# Patient Record
Sex: MALE | Race: WHITE | NOT HISPANIC OR LATINO | URBAN - METROPOLITAN AREA
[De-identification: names, ages, dates, MRNs, and addresses within clinical notes are randomized per-mention and may not be internally consistent; named-entity substitution may affect disease eponyms.]

---

## 2020-01-29 ENCOUNTER — APPOINTMENT (RX ONLY)
Dept: URBAN - METROPOLITAN AREA CLINIC 174 | Facility: CLINIC | Age: 55
Setting detail: DERMATOLOGY
End: 2020-01-29

## 2020-01-29 DIAGNOSIS — L82.1 OTHER SEBORRHEIC KERATOSIS: ICD-10-CM

## 2020-01-29 DIAGNOSIS — L30.0 NUMMULAR DERMATITIS: ICD-10-CM

## 2020-01-29 PROCEDURE — 99202 OFFICE O/P NEW SF 15 MIN: CPT

## 2020-01-29 PROCEDURE — ? TREATMENT REGIMEN

## 2020-01-29 PROCEDURE — ? COUNSELING

## 2020-01-29 PROCEDURE — ? PRESCRIPTION

## 2020-01-29 RX ORDER — TRIAMCINOLONE ACETONIDE 1 MG/G
1 APPLICATION OINTMENT TOPICAL BID
Qty: 2 | Refills: 0 | Status: ERX | COMMUNITY
Start: 2020-01-29

## 2020-01-29 RX ADMIN — TRIAMCINOLONE ACETONIDE 1 APPLICATION: 1 OINTMENT TOPICAL at 00:00

## 2020-01-29 ASSESSMENT — LOCATION SIMPLE DESCRIPTION DERM
LOCATION SIMPLE: CHEST
LOCATION SIMPLE: RIGHT CALF
LOCATION SIMPLE: RIGHT PRETIBIAL REGION
LOCATION SIMPLE: LEFT CALF
LOCATION SIMPLE: LEFT PRETIBIAL REGION

## 2020-01-29 ASSESSMENT — LOCATION ZONE DERM
LOCATION ZONE: TRUNK
LOCATION ZONE: LEG

## 2020-01-29 ASSESSMENT — LOCATION DETAILED DESCRIPTION DERM
LOCATION DETAILED: LEFT LATERAL SUPERIOR CHEST
LOCATION DETAILED: RIGHT DISTAL PRETIBIAL REGION
LOCATION DETAILED: LEFT DISTAL CALF
LOCATION DETAILED: RIGHT DISTAL CALF
LOCATION DETAILED: LEFT DISTAL PRETIBIAL REGION

## 2020-01-29 ASSESSMENT — SEVERITY ASSESSMENT: SEVERITY: MODERATE

## 2020-01-29 NOTE — PROCEDURE: MIPS QUALITY
Quality 130: Documentation Of Current Medications In The Medical Record: Current Medications Documented
Detail Level: Detailed
Quality 110: Preventive Care And Screening: Influenza Immunization: Influenza Immunization Administered during Influenza season
Quality 431: Preventive Care And Screening: Unhealthy Alcohol Use - Screening: Patient screened for unhealthy alcohol use using a single question and scores less than 2 times per year
Quality 226: Preventive Care And Screening: Tobacco Use: Screening And Cessation Intervention: Patient screened for tobacco use, is a smoker AND did not received Cessation Counseling for Unknown Reasons

## 2020-01-29 NOTE — PROCEDURE: TREATMENT REGIMEN
Samples Given: Eucerin Advanced Care: apply immediately after shower to legs; Aquaphor: apply PRN
Detail Level: Zone
Plan: Keep showers on the cooler side.  I stressed the importance of moisturization and taking the Claritin BID even if itch is controlled.  I will see him back in 1 month to reassess.

## 2021-02-11 ENCOUNTER — OFFICE VISIT (OUTPATIENT)
Dept: INTERNAL MEDICINE CLINIC | Age: 56
End: 2021-02-11
Payer: COMMERCIAL

## 2021-02-11 VITALS
HEART RATE: 87 BPM | SYSTOLIC BLOOD PRESSURE: 148 MMHG | TEMPERATURE: 97.4 F | WEIGHT: 250.2 LBS | BODY MASS INDEX: 32.11 KG/M2 | OXYGEN SATURATION: 98 % | HEIGHT: 74 IN | DIASTOLIC BLOOD PRESSURE: 92 MMHG

## 2021-02-11 DIAGNOSIS — E11.65 TYPE 2 DIABETES MELLITUS WITH HYPERGLYCEMIA, WITHOUT LONG-TERM CURRENT USE OF INSULIN (HCC): Primary | ICD-10-CM

## 2021-02-11 DIAGNOSIS — E78.2 MIXED HYPERLIPIDEMIA: ICD-10-CM

## 2021-02-11 DIAGNOSIS — M54.9 DORSALGIA: ICD-10-CM

## 2021-02-11 DIAGNOSIS — F32.A CHRONIC DEPRESSION: ICD-10-CM

## 2021-02-11 DIAGNOSIS — Z23 NEED FOR TDAP VACCINATION: ICD-10-CM

## 2021-02-11 DIAGNOSIS — J30.9 ALLERGIC RHINITIS, UNSPECIFIED SEASONALITY, UNSPECIFIED TRIGGER: ICD-10-CM

## 2021-02-11 DIAGNOSIS — I10 ESSENTIAL HYPERTENSION: ICD-10-CM

## 2021-02-11 DIAGNOSIS — J44.9 CHRONIC OBSTRUCTIVE PULMONARY DISEASE, UNSPECIFIED COPD TYPE (HCC): ICD-10-CM

## 2021-02-11 DIAGNOSIS — G62.9 NEUROPATHY: ICD-10-CM

## 2021-02-11 DIAGNOSIS — R53.83 FATIGUE, UNSPECIFIED TYPE: ICD-10-CM

## 2021-02-11 LAB
A/G RATIO: 1.9 (ref 1.1–2.2)
ALBUMIN SERPL-MCNC: 4.5 G/DL (ref 3.4–5)
ALP BLD-CCNC: 92 U/L (ref 40–129)
ALT SERPL-CCNC: 36 U/L (ref 10–40)
ANION GAP SERPL CALCULATED.3IONS-SCNC: 13 MMOL/L (ref 3–16)
AST SERPL-CCNC: 27 U/L (ref 15–37)
BASOPHILS ABSOLUTE: 0.1 K/UL (ref 0–0.2)
BASOPHILS RELATIVE PERCENT: 0.9 %
BILIRUB SERPL-MCNC: <0.2 MG/DL (ref 0–1)
BUN BLDV-MCNC: 8 MG/DL (ref 7–20)
CALCIUM SERPL-MCNC: 9.7 MG/DL (ref 8.3–10.6)
CHLORIDE BLD-SCNC: 104 MMOL/L (ref 99–110)
CHOLESTEROL, FASTING: 137 MG/DL (ref 0–199)
CHP ED QC CHECK: NORMAL
CO2: 24 MMOL/L (ref 21–32)
CREAT SERPL-MCNC: 0.9 MG/DL (ref 0.9–1.3)
EOSINOPHILS ABSOLUTE: 0.6 K/UL (ref 0–0.6)
EOSINOPHILS RELATIVE PERCENT: 5.8 %
GFR AFRICAN AMERICAN: >60
GFR NON-AFRICAN AMERICAN: >60
GLOBULIN: 2.4 G/DL
GLUCOSE BLD-MCNC: 142 MG/DL (ref 70–99)
GLUCOSE BLD-MCNC: 144 MG/DL
HCT VFR BLD CALC: 43.2 % (ref 40.5–52.5)
HDLC SERPL-MCNC: 36 MG/DL (ref 40–60)
HEMOGLOBIN: 14.7 G/DL (ref 13.5–17.5)
LDL CHOLESTEROL CALCULATED: ABNORMAL MG/DL
LDL CHOLESTEROL DIRECT: 73 MG/DL
LYMPHOCYTES ABSOLUTE: 2.9 K/UL (ref 1–5.1)
LYMPHOCYTES RELATIVE PERCENT: 30.2 %
MCH RBC QN AUTO: 30.4 PG (ref 26–34)
MCHC RBC AUTO-ENTMCNC: 33.9 G/DL (ref 31–36)
MCV RBC AUTO: 89.6 FL (ref 80–100)
MONOCYTES ABSOLUTE: 0.6 K/UL (ref 0–1.3)
MONOCYTES RELATIVE PERCENT: 6.6 %
NEUTROPHILS ABSOLUTE: 5.4 K/UL (ref 1.7–7.7)
NEUTROPHILS RELATIVE PERCENT: 56.5 %
PDW BLD-RTO: 14.3 % (ref 12.4–15.4)
PLATELET # BLD: 189 K/UL (ref 135–450)
PMV BLD AUTO: 9.9 FL (ref 5–10.5)
POTASSIUM SERPL-SCNC: 4.2 MMOL/L (ref 3.5–5.1)
RBC # BLD: 4.83 M/UL (ref 4.2–5.9)
SODIUM BLD-SCNC: 141 MMOL/L (ref 136–145)
TOTAL PROTEIN: 6.9 G/DL (ref 6.4–8.2)
TRIGLYCERIDE, FASTING: 306 MG/DL (ref 0–150)
TSH SERPL DL<=0.05 MIU/L-ACNC: 1.14 UIU/ML (ref 0.27–4.2)
VLDLC SERPL CALC-MCNC: ABNORMAL MG/DL
WBC # BLD: 9.6 K/UL (ref 4–11)

## 2021-02-11 PROCEDURE — G8926 SPIRO NO PERF OR DOC: HCPCS | Performed by: INTERNAL MEDICINE

## 2021-02-11 PROCEDURE — G8484 FLU IMMUNIZE NO ADMIN: HCPCS | Performed by: INTERNAL MEDICINE

## 2021-02-11 PROCEDURE — G8417 CALC BMI ABV UP PARAM F/U: HCPCS | Performed by: INTERNAL MEDICINE

## 2021-02-11 PROCEDURE — 3023F SPIROM DOC REV: CPT | Performed by: INTERNAL MEDICINE

## 2021-02-11 PROCEDURE — 82962 GLUCOSE BLOOD TEST: CPT | Performed by: INTERNAL MEDICINE

## 2021-02-11 PROCEDURE — 36415 COLL VENOUS BLD VENIPUNCTURE: CPT | Performed by: INTERNAL MEDICINE

## 2021-02-11 PROCEDURE — 3046F HEMOGLOBIN A1C LEVEL >9.0%: CPT | Performed by: INTERNAL MEDICINE

## 2021-02-11 PROCEDURE — 3017F COLORECTAL CA SCREEN DOC REV: CPT | Performed by: INTERNAL MEDICINE

## 2021-02-11 PROCEDURE — G8427 DOCREV CUR MEDS BY ELIG CLIN: HCPCS | Performed by: INTERNAL MEDICINE

## 2021-02-11 PROCEDURE — 2022F DILAT RTA XM EVC RTNOPTHY: CPT | Performed by: INTERNAL MEDICINE

## 2021-02-11 PROCEDURE — 99204 OFFICE O/P NEW MOD 45 MIN: CPT | Performed by: INTERNAL MEDICINE

## 2021-02-11 PROCEDURE — 4004F PT TOBACCO SCREEN RCVD TLK: CPT | Performed by: INTERNAL MEDICINE

## 2021-02-11 RX ORDER — FLUTICASONE PROPIONATE 50 MCG
1 SPRAY, SUSPENSION (ML) NASAL DAILY
COMMUNITY
End: 2021-02-11 | Stop reason: SDUPTHER

## 2021-02-11 RX ORDER — M-VIT,TX,IRON,MINS/CALC/FOLIC 27MG-0.4MG
1 TABLET ORAL DAILY
Qty: 30 TABLET | Refills: 11 | Status: SHIPPED | OUTPATIENT
Start: 2021-02-11 | End: 2021-05-14 | Stop reason: SDUPTHER

## 2021-02-11 RX ORDER — METFORMIN HYDROCHLORIDE 500 MG/1
500 TABLET, EXTENDED RELEASE ORAL
COMMUNITY
End: 2021-02-11 | Stop reason: SDUPTHER

## 2021-02-11 RX ORDER — FLUTICASONE PROPIONATE 50 MCG
1 SPRAY, SUSPENSION (ML) NASAL DAILY
Qty: 1 BOTTLE | Refills: 3 | Status: SHIPPED | OUTPATIENT
Start: 2021-02-11 | End: 2021-05-14 | Stop reason: SDUPTHER

## 2021-02-11 RX ORDER — DULAGLUTIDE 0.75 MG/.5ML
0.75 INJECTION, SOLUTION SUBCUTANEOUS WEEKLY
Qty: 4 PEN | Refills: 3 | Status: SHIPPED | OUTPATIENT
Start: 2021-02-11 | End: 2021-02-12 | Stop reason: DRUGHIGH

## 2021-02-11 RX ORDER — LISINOPRIL 20 MG/1
20 TABLET ORAL DAILY
Qty: 90 TABLET | Refills: 1 | Status: SHIPPED | OUTPATIENT
Start: 2021-02-11 | End: 2021-05-14 | Stop reason: SDUPTHER

## 2021-02-11 RX ORDER — GABAPENTIN 300 MG/1
300 CAPSULE ORAL 3 TIMES DAILY
COMMUNITY
End: 2021-02-11 | Stop reason: SDUPTHER

## 2021-02-11 RX ORDER — NAPROXEN 375 MG/1
375 TABLET ORAL 2 TIMES DAILY WITH MEALS
Qty: 60 TABLET | Refills: 5 | Status: SHIPPED | OUTPATIENT
Start: 2021-02-11 | End: 2021-05-14 | Stop reason: SDUPTHER

## 2021-02-11 RX ORDER — ALBUTEROL SULFATE 90 UG/1
2 AEROSOL, METERED RESPIRATORY (INHALATION) EVERY 6 HOURS PRN
Qty: 1 INHALER | Refills: 3 | Status: SHIPPED | OUTPATIENT
Start: 2021-02-11 | End: 2021-05-14 | Stop reason: SDUPTHER

## 2021-02-11 RX ORDER — ATORVASTATIN CALCIUM 20 MG/1
20 TABLET, FILM COATED ORAL DAILY
Qty: 30 TABLET | Refills: 3 | Status: SHIPPED | OUTPATIENT
Start: 2021-02-11 | End: 2021-05-14 | Stop reason: SDUPTHER

## 2021-02-11 RX ORDER — BUPROPION HYDROCHLORIDE 150 MG/1
150 TABLET, EXTENDED RELEASE ORAL DAILY
Qty: 30 TABLET | Refills: 3 | Status: SHIPPED | OUTPATIENT
Start: 2021-02-11 | End: 2021-05-14 | Stop reason: SDUPTHER

## 2021-02-11 RX ORDER — BUPROPION HYDROCHLORIDE 150 MG/1
150 TABLET, EXTENDED RELEASE ORAL DAILY
COMMUNITY
End: 2021-02-11 | Stop reason: SDUPTHER

## 2021-02-11 RX ORDER — ATORVASTATIN CALCIUM 20 MG/1
20 TABLET, FILM COATED ORAL DAILY
COMMUNITY
End: 2021-02-11 | Stop reason: SDUPTHER

## 2021-02-11 RX ORDER — DULAGLUTIDE 0.75 MG/.5ML
0.75 INJECTION, SOLUTION SUBCUTANEOUS WEEKLY
COMMUNITY
End: 2021-02-11 | Stop reason: SDUPTHER

## 2021-02-11 RX ORDER — METFORMIN HYDROCHLORIDE 500 MG/1
500 TABLET, EXTENDED RELEASE ORAL
Qty: 30 TABLET | Refills: 3 | Status: SHIPPED | OUTPATIENT
Start: 2021-02-11 | End: 2021-05-14 | Stop reason: SDUPTHER

## 2021-02-11 RX ORDER — ALBUTEROL SULFATE 90 UG/1
2 AEROSOL, METERED RESPIRATORY (INHALATION) EVERY 6 HOURS PRN
COMMUNITY
End: 2021-02-11 | Stop reason: SDUPTHER

## 2021-02-11 RX ORDER — CITALOPRAM 20 MG/1
20 TABLET ORAL DAILY
Qty: 30 TABLET | Refills: 3 | Status: SHIPPED | OUTPATIENT
Start: 2021-02-11 | End: 2021-05-14 | Stop reason: SDUPTHER

## 2021-02-11 RX ORDER — GABAPENTIN 300 MG/1
300 CAPSULE ORAL 3 TIMES DAILY
Qty: 90 CAPSULE | Refills: 3 | Status: SHIPPED | OUTPATIENT
Start: 2021-02-11 | End: 2021-05-14 | Stop reason: SDUPTHER

## 2021-02-11 RX ORDER — CITALOPRAM 20 MG/1
20 TABLET ORAL DAILY
COMMUNITY
End: 2021-02-11 | Stop reason: SDUPTHER

## 2021-02-11 SDOH — ECONOMIC STABILITY: TRANSPORTATION INSECURITY
IN THE PAST 12 MONTHS, HAS THE LACK OF TRANSPORTATION KEPT YOU FROM MEDICAL APPOINTMENTS OR FROM GETTING MEDICATIONS?: NOT ASKED

## 2021-02-11 SDOH — ECONOMIC STABILITY: TRANSPORTATION INSECURITY
IN THE PAST 12 MONTHS, HAS LACK OF TRANSPORTATION KEPT YOU FROM MEETINGS, WORK, OR FROM GETTING THINGS NEEDED FOR DAILY LIVING?: NOT ASKED

## 2021-02-11 SDOH — ECONOMIC STABILITY: INCOME INSECURITY: HOW HARD IS IT FOR YOU TO PAY FOR THE VERY BASICS LIKE FOOD, HOUSING, MEDICAL CARE, AND HEATING?: SOMEWHAT HARD

## 2021-02-11 SDOH — ECONOMIC STABILITY: FOOD INSECURITY: WITHIN THE PAST 12 MONTHS, YOU WORRIED THAT YOUR FOOD WOULD RUN OUT BEFORE YOU GOT MONEY TO BUY MORE.: SOMETIMES TRUE

## 2021-02-11 ASSESSMENT — PATIENT HEALTH QUESTIONNAIRE - PHQ9
SUM OF ALL RESPONSES TO PHQ QUESTIONS 1-9: 0
SUM OF ALL RESPONSES TO PHQ QUESTIONS 1-9: 0
1. LITTLE INTEREST OR PLEASURE IN DOING THINGS: 0

## 2021-02-11 NOTE — LETTER
[address] [place] [address] Internal and Family Medicine  [address] [place] [id number]  Phone: [phone]  Fax: [phone]    Sarah Patiño MD        February 11, 2021     Patient: Ruy Snyder   YOB: 1965   Date of Visit: 2/11/2021       To Whom It May Concern: It is my medical opinion that Ruy Snyder can return to work on 2/12/2021. He had an appointment on 2/11/2012. If you have any questions or concerns, please don't hesitate to call.     Sincerely,        Sarah Patiño MD

## 2021-02-11 NOTE — PROGRESS NOTES
Name: Fátima Chiu  [id number]  Age: 60 y.o. YOB: 1965  Sex: male    CHIEF COMPLAINT:    Chief Complaint   Patient presents with    New Patient    Hypertension    Diabetes       HISTORY OF PRESENT ILLNESS:     This is a pleasant  60 y.o. male  is seen today to establish care and for management of chronic medical problems and medications refills. Previous records reviewed . He was seeing a primary care doctor in [facility]. Moved to [place] recently. Complains of being tired. Denies CP, complains of shortness of breath on and off. He continues to smoke about 1 pack/day and not interested in quitting smoking  No fever , sore throat or cough or congestion. Denies any abdominal pain. Appetite OK. Bowels moving [id number] No urinary symptoms. Denies any significant arthritis. Has Chronic low back pain. And some paresthesia of his legs. Hearing is ok. Vision Ok with glasses. Denies  any significant skin lesions. Denies any significant depression or anxiety. Medications help. No other complaints. Does not check sugars at home. .. has no machine. Has not had tetanus for more than 10 years.       Past Medical History:    Patient Active Problem List   Diagnosis    Type 2 diabetes mellitus with hyperglycemia, without long-term current use of insulin ([facility] [address]    Essential hypertension    Mixed hyperlipidemia    Allergic rhinitis    Chronic obstructive pulmonary disease (HCC)    Chronic depression    Need for Tdap vaccination    Neuropathy    Fatigue        Past Surgical History:        Procedure Laterality Date    KNEE CARTILAGE SURGERY  January 2012       Social History:   Social History     Tobacco Use    Smoking status: Current Every Day Smoker     Packs/day: 1.00     Types: Cigarettes     Start date: 2/11/1971    Smokeless tobacco: Never Used   Substance Use Topics    Alcohol use: Yes     Comment: occasional       Family History:       Problem Relation Age of Onset  Cancer Mother     High Blood Pressure Mother     Diabetes Mother     Diabetes Father        Allergies:  Vicodin [hydrocodone-acetaminophen]    Current Medications :      Prior to Admission medications    Medication Sig Start Date End Date Taking? Authorizing Provider   mometasone-formoterol [facility]) 100-5 MCG/ACT inhaler Inhale 2 puffs into the lungs 2 times daily 2/11/21  Yes Noé Huerta MD   metFORMIN (GLUCOPHAGE-XR) 500 MG extended release tablet Take 1 tablet by mouth daily (with breakfast) 2/11/21  Yes Noé Huerta MD   gabapentin (NEURONTIN) 300 MG capsule Take 1 capsule by mouth 3 times daily for 30 days. 2/11/21 3/13/21 Yes Noé Huerta MD   fluticasone [facility]) 50 MCG/ACT nasal spray 1 spray by Each Nostril route daily 2/11/21  Yes Noé Huerta MD   Dulaglutide (TRULICITY) 6.34 UL/6.8VI SOPN Inject 0.75 mg into the skin once a week 2/11/21  Yes Noé Huerta MD   citalopram (CELEXA) 20 MG tablet Take 1 tablet by mouth daily 2/11/21  Yes Noé Huerta MD   buPROPion [facility]) 150 MG extended release tablet Take 1 tablet by mouth daily 2/11/21  Yes Noé Huerta MD   atorvastatin (LIPITOR) 20 MG tablet Take 1 tablet by mouth daily 2/11/21  Yes Noé Huerta MD   albuterol sulfate HFA (VENTOLIN HFA) 108 (90 Base) MCG/ACT inhaler Inhale 2 puffs into the lungs every 6 hours as needed for Wheezing 2/11/21  Yes Noé Huerta MD   blood glucose monitor kit and supplies Dispense sufficient amount for indicated testing frequency plus additional to accommodate PRN testing needs. Dispense all needed supplies to include: monitor, strips, lancing device, lancets, control solutions, alcohol swabs.  2/11/21  Yes Noé Huerta MD   tiotropium (SPIRIVA RESPIMAT) 2.5 MCG/ACT AERS inhaler Inhale 2 puffs into the lungs daily 2/11/21  Yes Noé Huerta MD   naproxen (NAPROSYN) 375 MG tablet Take 1 tablet by mouth 2 times daily (with meals) 2/11/21  Yes Noé Huerta MD Multiple Vitamins-Minerals (THERAPEUTIC MULTIVITAMIN-MINERALS) tablet Take 1 tablet by mouth daily 2/11/21 2/11/22 Yes Bridgette Moody MD   lisinopril (PRINIVIL;ZESTRIL) 20 MG tablet Take 1 tablet by mouth daily 2/11/21  Yes Bridgette Moody MD       LAB DATA: Reviewed. REVIEW OF SYSTEMS:   see HPI/ Comprehensive review of systems negative except for the ones mentioned in HPI. PHYSICAL EXAMINATION:   BP (!) 148/92 (Site: Right Upper Arm, Position: Sitting, Cuff Size: Medium Adult)   Pulse 87   Temp 97.4 °F (36.3 °C)   Ht 6' 2\" (1.88 m)   Wt 250 lb 3.2 oz (113.5 kg)   SpO2 98%   BMI 32.12 kg/m²      GENERAL APPEARANCE:    Alert, oriented x 3, well developed, cooperative, not in any distress, appears stated age. HEAD:   Normocephalic, atraumatic   EYES:   PERRLA, EOMI, lids normal, conjuctivea clear, sclera anicteric. NECK:    Supple, symmetrical,  trachea midline, no thyromegaly, no JVD, no lymphadenopathy. LUNGS:    Minimal rhonchi, essentially clear  HEART:     Regular rate and rhythm, S1 and S2 normal, no murmur, rub or gallop. PMI in MCL. ABDOMEN:    Soft, non-tender, bowel sounds are normoactive, no masses, no hepatospleenomegaly. EXTREMITY:   no bipedal edema  NEURO:  Alert, oriented to person, place and time. Grossly intact. Musculoskeletal:         No kyphosis or scoliosis, mild tenderness lower back. Skin:                            Warm and dry. No rash or obvious suspicious lesions. PSYCH:  Mood euthymic, insight and judgement good. ASSESSMENT/PLAN:    1. Type 2 diabetes mellitus with hyperglycemia, without long-term current use of insulin ([facility])  Home glucose monitor ordered. Advised to check sugars at least 2-3 times a day. Advised diet, exercise and weight loss. - POCT Glucose  - metFORMIN (GLUCOPHAGE-XR) 500 MG extended release tablet; Take 1 tablet by mouth daily (with breakfast)  Dispense: 30 tablet;  Refill: 3 - Dulaglutide (TRULICITY) 5.63 ZD/6.6IK SOPN; Inject 0.75 mg into the skin once a week  Dispense: 4 pen; Refill: 3  - Hemoglobin A1C  - blood glucose monitor kit and supplies; Dispense sufficient amount for indicated testing frequency plus additional to accommodate PRN testing needs. Dispense all needed supplies to include: monitor, strips, lancing device, lancets, control solutions, alcohol swabs. Dispense: 1 kit; Refill: 0    2. Essential hypertension  Low salt diet and exercise advised. Start him on lisinopril  - Comprehensive Metabolic Panel  - CBC Auto Differential  - lisinopril (PRINIVIL;ZESTRIL) 20 MG tablet; Take 1 tablet by mouth daily  Dispense: 90 tablet; Refill: 1    3. Mixed hyperlipidemia  Patient is taking cholesterol medications regularly. Denies any side effects. Diet and exercise advised. - atorvastatin (LIPITOR) 20 MG tablet; Take 1 tablet by mouth daily  Dispense: 30 tablet; Refill: 3  - Lipid, Fasting    4. Allergic rhinitis, unspecified seasonality, unspecified trigger  Continue Flonase  - fluticasone (FLONASE) 50 MCG/ACT nasal spray; 1 spray by Each Nostril route daily  Dispense: 1 Bottle; Refill: 3    5. Chronic obstructive pulmonary disease, unspecified COPD type ([facility] [address]  Continue Dulera and add Spiriva. - mometasone-formoterol (DULERA) 100-5 MCG/ACT inhaler; Inhale 2 puffs into the lungs 2 times daily  Dispense: 1 Inhaler; Refill: 3  - albuterol sulfate HFA (VENTOLIN HFA) 108 (90 Base) MCG/ACT inhaler; Inhale 2 puffs into the lungs every 6 hours as needed for Wheezing  Dispense: 1 Inhaler; Refill: 3  - tiotropium (SPIRIVA RESPIMAT) 2.5 MCG/ACT AERS inhaler; Inhale 2 puffs into the lungs daily  Dispense: 1 Inhaler; Refill: 3  - [facility] ([facility]) - Deyanira Vuong MD, Pulmonlogy, [place]    6. Chronic depression  Continue Celexa and Wellbutrin. - citalopram (CELEXA) 20 MG tablet; Take 1 tablet by mouth daily  Dispense: 30 tablet;  Refill: 3 This dictation was performed with a verbal recognition program and it was checked for errors. It is possible that there are still dictated errors within this office note. Any errors should be brought immediately to my attention for correction. All efforts were made to ensure that this office note is accurate.      Po Thomas MD

## 2021-02-12 DIAGNOSIS — E11.65 TYPE 2 DIABETES MELLITUS WITH HYPERGLYCEMIA, WITHOUT LONG-TERM CURRENT USE OF INSULIN (HCC): ICD-10-CM

## 2021-02-12 LAB
ESTIMATED AVERAGE GLUCOSE: 200.1 MG/DL
HBA1C MFR BLD: 8.6 %

## 2021-02-12 RX ORDER — DULAGLUTIDE 1.5 MG/.5ML
1.5 INJECTION, SOLUTION SUBCUTANEOUS WEEKLY
Qty: 4 PEN | Refills: 3 | Status: SHIPPED | OUTPATIENT
Start: 2021-02-12 | End: 2021-05-14 | Stop reason: SDUPTHER

## 2021-02-18 ENCOUNTER — TELEPHONE (OUTPATIENT)
Dept: INTERNAL MEDICINE CLINIC | Age: 56
End: 2021-02-18

## 2021-02-18 DIAGNOSIS — K21.9 GASTROESOPHAGEAL REFLUX DISEASE WITHOUT ESOPHAGITIS: Primary | ICD-10-CM

## 2021-02-18 RX ORDER — OMEPRAZOLE 20 MG/1
20 CAPSULE, DELAYED RELEASE ORAL DAILY
Qty: 30 CAPSULE | Refills: 3 | Status: SHIPPED | OUTPATIENT
Start: 2021-02-18 | End: 2021-05-14 | Stop reason: SDUPTHER

## 2021-02-18 NOTE — TELEPHONE ENCOUNTER
Patient called and complained of heartburn and he wanted to know if we could send a script to the pharmacy for Heartburn. Please advise.

## 2021-02-18 NOTE — TELEPHONE ENCOUNTER
Prilosec 20 mg daily ordered for heartburns. Prescription was sent. Advised to cut back the use of naproxen.

## 2021-03-12 ENCOUNTER — OFFICE VISIT (OUTPATIENT)
Dept: INTERNAL MEDICINE CLINIC | Age: 56
End: 2021-03-12
Payer: COMMERCIAL

## 2021-03-12 VITALS
OXYGEN SATURATION: 95 % | TEMPERATURE: 96.6 F | HEART RATE: 87 BPM | BODY MASS INDEX: 31.71 KG/M2 | WEIGHT: 247 LBS | SYSTOLIC BLOOD PRESSURE: 148 MMHG | DIASTOLIC BLOOD PRESSURE: 92 MMHG

## 2021-03-12 DIAGNOSIS — R53.83 FATIGUE, UNSPECIFIED TYPE: ICD-10-CM

## 2021-03-12 DIAGNOSIS — J30.9 ALLERGIC RHINITIS, UNSPECIFIED SEASONALITY, UNSPECIFIED TRIGGER: ICD-10-CM

## 2021-03-12 DIAGNOSIS — M54.9 DORSALGIA: ICD-10-CM

## 2021-03-12 DIAGNOSIS — J20.9 ACUTE BRONCHITIS, UNSPECIFIED ORGANISM: Primary | ICD-10-CM

## 2021-03-12 DIAGNOSIS — E11.65 TYPE 2 DIABETES MELLITUS WITH HYPERGLYCEMIA, WITHOUT LONG-TERM CURRENT USE OF INSULIN (HCC): ICD-10-CM

## 2021-03-12 DIAGNOSIS — E78.2 MIXED HYPERLIPIDEMIA: ICD-10-CM

## 2021-03-12 DIAGNOSIS — F32.A CHRONIC DEPRESSION: ICD-10-CM

## 2021-03-12 DIAGNOSIS — I10 ESSENTIAL HYPERTENSION: ICD-10-CM

## 2021-03-12 DIAGNOSIS — Z72.0 TOBACCO ABUSE: ICD-10-CM

## 2021-03-12 DIAGNOSIS — J44.9 CHRONIC OBSTRUCTIVE PULMONARY DISEASE, UNSPECIFIED COPD TYPE (HCC): ICD-10-CM

## 2021-03-12 LAB
CHP ED QC CHECK: NORMAL
GLUCOSE BLD-MCNC: 160 MG/DL

## 2021-03-12 PROCEDURE — 3023F SPIROM DOC REV: CPT | Performed by: INTERNAL MEDICINE

## 2021-03-12 PROCEDURE — G8417 CALC BMI ABV UP PARAM F/U: HCPCS | Performed by: INTERNAL MEDICINE

## 2021-03-12 PROCEDURE — 4004F PT TOBACCO SCREEN RCVD TLK: CPT | Performed by: INTERNAL MEDICINE

## 2021-03-12 PROCEDURE — 3017F COLORECTAL CA SCREEN DOC REV: CPT | Performed by: INTERNAL MEDICINE

## 2021-03-12 PROCEDURE — G8926 SPIRO NO PERF OR DOC: HCPCS | Performed by: INTERNAL MEDICINE

## 2021-03-12 PROCEDURE — G8427 DOCREV CUR MEDS BY ELIG CLIN: HCPCS | Performed by: INTERNAL MEDICINE

## 2021-03-12 PROCEDURE — 82962 GLUCOSE BLOOD TEST: CPT | Performed by: INTERNAL MEDICINE

## 2021-03-12 PROCEDURE — 3052F HG A1C>EQUAL 8.0%<EQUAL 9.0%: CPT | Performed by: INTERNAL MEDICINE

## 2021-03-12 PROCEDURE — 99214 OFFICE O/P EST MOD 30 MIN: CPT | Performed by: INTERNAL MEDICINE

## 2021-03-12 PROCEDURE — G8484 FLU IMMUNIZE NO ADMIN: HCPCS | Performed by: INTERNAL MEDICINE

## 2021-03-12 PROCEDURE — 2022F DILAT RTA XM EVC RTNOPTHY: CPT | Performed by: INTERNAL MEDICINE

## 2021-03-12 RX ORDER — DEXTROMETHORPHAN POLISTIREX 30 MG/5ML
60 SUSPENSION ORAL 2 TIMES DAILY PRN
Qty: 1 BOTTLE | Refills: 0 | Status: SHIPPED | OUTPATIENT
Start: 2021-03-12 | End: 2021-05-03

## 2021-03-12 RX ORDER — LEVOFLOXACIN 500 MG/1
500 TABLET, FILM COATED ORAL DAILY
Qty: 10 TABLET | Refills: 0 | Status: SHIPPED | OUTPATIENT
Start: 2021-03-12 | End: 2021-05-03

## 2021-03-12 RX ORDER — GUAIFENESIN 600 MG/1
600 TABLET, EXTENDED RELEASE ORAL 2 TIMES DAILY
Qty: 30 TABLET | Refills: 0 | Status: SHIPPED | OUTPATIENT
Start: 2021-03-12 | End: 2021-05-03

## 2021-03-12 NOTE — PROGRESS NOTES
Name: Gurinder Woodson  [id number]  Age: 60 y.o. YOB: 1965  Sex: male    CHIEF COMPLAINT:    Chief Complaint   Patient presents with    Cough     can't hardly talk     1 Month Follow-Up    Diabetes    Hypertension     has not taken meds yet        HISTORY OF PRESENT ILLNESS:     This is a pleasant  60 y.o. male  is seen today for management of chronic medical problems and medications refills. Previous records reviewed . Patient complains of cough and chest congestion from last 3 weeks. Denies any fever or chills or sore throat. Denies any chest pain but has some shortness of breath. Using his inhalers regularly and seeing his pulmonologist periodically. Continues to smoke about 1 pack/day. Not interested in quitting smoking. Does not want any help to quit smoking. But he will try to cut back on smoking. Denies any abdominal pain. Appetite OK. Bowels moving [facility]. No urinary symptoms. Has mild chronic low back pain and has mild paresthesia of his legs. Hearing is ok. Vision Ok with glasses. Denies  any significant skin lesions. Denies any significant depression or anxiety. He does not check his sugars often. But he claims that they are okay at home. No other complaints. Labs from last visit reviewed and explained to him.       Past Medical History:    Patient Active Problem List   Diagnosis    Type 2 diabetes mellitus with hyperglycemia, without long-term current use of insulin ([address]    Essential hypertension    Mixed hyperlipidemia    Allergic rhinitis    Chronic obstructive pulmonary disease (HCC)    Chronic depression    Need for Tdap vaccination    Neuropathy    Fatigue    Dorsalgia        Past Surgical History:        Procedure Laterality Date    KNEE CARTILAGE SURGERY  January 2012       Social History:   Social History     Tobacco Use    Smoking status: Current Every Day Smoker     Packs/day: 1.00     Years: 45.00     Pack years: 45.00     Types: Cigarettes Start date: 2/11/1971    Smokeless tobacco: Never Used   Substance Use Topics    Alcohol use: Yes     Comment: occasional       Family History:       Problem Relation Age of Onset    Cancer Mother     High Blood Pressure Mother     Diabetes Mother     Diabetes Father        Allergies:  Vicodin [hydrocodone-acetaminophen]    Current Medications :      Prior to Admission medications    Medication Sig Start Date End Date Taking? Authorizing Provider   levoFLOXacin (LEVAQUIN) 500 MG tablet Take 1 tablet by mouth daily for 10 days 3/12/21 3/22/21 Yes Juanita Erazo MD   guaiFENesin (MUCINEX) 600 MG extended release tablet Take 1 tablet by mouth 2 times daily for 15 days 3/12/21 3/27/21 Yes Juanita Erazo MD   dextromethorphan [facility]) 30 MG/5ML extended release liquid Take 10 mLs by mouth 2 times daily as needed for Cough 3/12/21 3/22/21 Yes Juanita Erazo MD   omeprazole (PRILOSEC) 20 MG delayed release capsule Take 1 capsule by mouth daily 2/18/21  Yes Juanita Erazo MD   blood glucose monitor kit and supplies 1 kit by Other route 3 times daily Dispense sufficient amount (tests 3 times daily) for indicated testing frequency. Dispense all needed supplies to include: monitor, strips, lancing device, lancets, control solutions, and alcohol swabs. Dispense enough for a 30 day supply with 0 refills. 2/12/21  Yes Juanita Erazo MD   Dulaglutide (TRULICITY) 1.5 EY/3.5ZL SOPN Inject 1.5 mg into the skin once a week 2/12/21  Yes Juanita Erazo MD   mometasone-formoterol [facility]) 100-5 MCG/ACT inhaler Inhale 2 puffs into the lungs 2 times daily 2/11/21  Yes Juanita Erazo MD   metFORMIN (GLUCOPHAGE-XR) 500 MG extended release tablet Take 1 tablet by mouth daily (with breakfast) 2/11/21  Yes Juanita Erazo MD   gabapentin (NEURONTIN) 300 MG capsule Take 1 capsule by mouth 3 times daily for 30 days.  2/11/21 3/13/21 Yes Juanita Erazo MD   fluticasone [facility]) 50 MCG/ACT nasal spray 1 spray by Each Nostril route daily 2/11/21  Yes Malcolm Sierra MD   citalopram (CELEXA) 20 MG tablet Take 1 tablet by mouth daily 2/11/21  Yes Malcolm Sierra MD   buPROPion [facility] SR) 150 MG extended release tablet Take 1 tablet by mouth daily 2/11/21  Yes Malcolm Sierra MD   atorvastatin (LIPITOR) 20 MG tablet Take 1 tablet by mouth daily 2/11/21  Yes Malcolm Sierra MD   albuterol sulfate HFA (VENTOLIN HFA) 108 (90 Base) MCG/ACT inhaler Inhale 2 puffs into the lungs every 6 hours as needed for Wheezing 2/11/21  Yes Malcolm Sierra MD   tiotropium (SPIRIVA RESPIMAT) 2.5 MCG/ACT AERS inhaler Inhale 2 puffs into the lungs daily 2/11/21  Yes Malcolm Sierra MD   naproxen (NAPROSYN) 375 MG tablet Take 1 tablet by mouth 2 times daily (with meals) 2/11/21  Yes Malcolm Sierra MD   Multiple Vitamins-Minerals (THERAPEUTIC MULTIVITAMIN-MINERALS) tablet Take 1 tablet by mouth daily 2/11/21 2/11/22 Yes Malcolm Sierra MD   lisinopril (PRINIVIL;ZESTRIL) 20 MG tablet Take 1 tablet by mouth daily 2/11/21  Yes Malcolm Sierra MD       LAB DATA: Reviewed. REVIEW OF SYSTEMS:   see HPI/ Comprehensive review of systems negative except for the ones mentioned in HPI. PHYSICAL EXAMINATION:   BP (!) 148/92   Pulse 87   Temp 96.6 °F (35.9 °C)   Wt 247 lb (112 kg)   SpO2 95%   BMI 31.71 kg/m²      GENERAL APPEARANCE:    Alert, oriented x 3, well developed, cooperative, not in any distress, appears stated age. HEAD:   Normocephalic, atraumatic   EYES:   PERRLA, EOMI, lids normal, conjuctivea clear, sclera anicteric. NECK:    Supple, symmetrical,  trachea midline, no thyromegaly, no JVD, no lymphadenopathy. LUNGS:    Few rhonchi bilaterally. HEART:     Regular rate and rhythm, S1 and S2 normal, no murmur, rub or gallop. PMI in MCL. ABDOMEN:    Soft, non-tender, bowel sounds are normoactive, no masses, no hepatospleenomegaly. .  Patient is obese  EXTREMITY:   no bipedal edema  NEURO:  Alert, oriented to person, place and time. Grossly intact.   Musculoskeletal:         No kyphosis or scoliosis, mild tenderness in his lower back. Skin:                            Warm and dry. No rash or obvious suspicious lesions. PSYCH:  Mood euthymic, insight and judgement good. ASSESSMENT/PLAN:    1. Acute bronchitis, unspecified organism  Advised to check Covid vaccination if medications does not improve his symptoms in 1 week. - levoFLOXacin (LEVAQUIN) 500 MG tablet; Take 1 tablet by mouth daily for 10 days  Dispense: 10 tablet; Refill: 0  - guaiFENesin (MUCINEX) 600 MG extended release tablet; Take 1 tablet by mouth 2 times daily for 15 days  Dispense: 30 tablet; Refill: 0  - dextromethorphan (DELSYM) 30 MG/5ML extended release liquid; Take 10 mLs by mouth 2 times daily as needed for Cough  Dispense: 1 Bottle; Refill: 0  Continue his inhalers and quit smoking. 2. Type 2 diabetes mellitus with hyperglycemia, without long-term current use of insulin (HCC)  Advised to check his sugars often and control his sugar better. Diet, exercise and weight loss advised. Continue Glucophage and Trulicity. - POCT Glucose    3. Essential hypertension  Continue current medications, denies side effect with medicationss. Low salt diet and exercise advised. Continue lisinopril    4. Mixed hyperlipidemia  Patient is taking cholesterol medications regularly. Denies any side effects. Diet and exercise advised. Continue Lipitor    5. Allergic rhinitis, unspecified seasonality, unspecified trigger  Continue Flonase    6. Chronic obstructive pulmonary disease, unspecified COPD type ([facility] [address]  Patient on Spiriva, Dulera and albuterol HFA as needed. Advised extensively to quit smoking. Advised to keep following with his pulmonologist.    7. Chronic depression  Continue Celexa    8. Dorsalgia  Patient on naproxen and Tylenol as needed. Also on Neurontin    9. Fatigue, unspecified type  Advised exercise and weight loss. 10. Tobacco abuse  Advised extensively to quit smoking.     I have recommended that the patient follow CDC guidelines for prevention of COVID-19 infection. I also discussed Coronavirus precaution including wearing face mask, handwashing practice, wiping items touched in public such as gas pumps, door handles, shopping carts, etc. Also Self monitoring for infection - fever, chills, cough, SOB. Should he/she develop symptoms he/she should call office or go to ER for further instructions. Care discussed with patient. Questions answered and patient verbalizes understanding and agrees with plan. Medications reviewed and reconciled. Continue current medications. Appropriate prescriptions are ordered. Risks and benefits of meds are discussed. After visit summary provided. Advised to call for any problems, questions, or concerns. If symptoms worsen or don't improve as expected, to call us or go to ER. Follow up as directed, sooner if needed. Return in about 8 weeks (around 5/7/2021). This dictation was performed with a verbal recognition program and it was checked for errors. It is possible that there are still dictated errors within this office note. Any errors should be brought immediately to my attention for correction. All efforts were made to ensure that this office note is accurate.      Kriss Miranda MD

## 2021-05-02 DIAGNOSIS — J20.9 ACUTE BRONCHITIS, UNSPECIFIED ORGANISM: ICD-10-CM

## 2021-05-03 RX ORDER — LEVOFLOXACIN 500 MG/1
TABLET, FILM COATED ORAL
Qty: 10 TABLET | Refills: 0 | Status: SHIPPED | OUTPATIENT
Start: 2021-05-03 | End: 2021-05-14

## 2021-05-03 RX ORDER — ASPIRIN 325 MG
TABLET ORAL
Qty: 30 TABLET | Refills: 0 | Status: SHIPPED | OUTPATIENT
Start: 2021-05-03

## 2021-05-03 RX ORDER — DEXTROMETHORPHAN POLISTIREX 30 MG/5ML
60 SUSPENSION ORAL 2 TIMES DAILY PRN
Qty: 200 ML | Refills: 0 | Status: SHIPPED | OUTPATIENT
Start: 2021-05-03 | End: 2021-05-14

## 2021-05-14 ENCOUNTER — HOSPITAL ENCOUNTER (OUTPATIENT)
Age: 56
Discharge: HOME | End: 2021-05-14
Payer: COMMERCIAL

## 2021-05-14 ENCOUNTER — OFFICE VISIT (OUTPATIENT)
Dept: INTERNAL MEDICINE CLINIC | Age: 56
End: 2021-05-14
Payer: COMMERCIAL

## 2021-05-14 ENCOUNTER — HOSPITAL ENCOUNTER (OUTPATIENT)
Dept: GENERAL RADIOLOGY | Age: 56
Discharge: HOME | End: 2021-05-14
Payer: COMMERCIAL

## 2021-05-14 VITALS
HEART RATE: 94 BPM | SYSTOLIC BLOOD PRESSURE: 138 MMHG | HEIGHT: 74 IN | WEIGHT: 250 LBS | BODY MASS INDEX: 32.08 KG/M2 | TEMPERATURE: 97.7 F | OXYGEN SATURATION: 96 % | DIASTOLIC BLOOD PRESSURE: 78 MMHG

## 2021-05-14 DIAGNOSIS — Z11.59 ENCOUNTER FOR HEPATITIS C SCREENING TEST FOR LOW RISK PATIENT: ICD-10-CM

## 2021-05-14 DIAGNOSIS — M54.9 DORSALGIA: ICD-10-CM

## 2021-05-14 DIAGNOSIS — M25.412 PAIN AND SWELLING OF LEFT SHOULDER: ICD-10-CM

## 2021-05-14 DIAGNOSIS — E78.2 MIXED HYPERLIPIDEMIA: ICD-10-CM

## 2021-05-14 DIAGNOSIS — J44.9 CHRONIC OBSTRUCTIVE PULMONARY DISEASE, UNSPECIFIED COPD TYPE (HCC): ICD-10-CM

## 2021-05-14 DIAGNOSIS — M25.512 PAIN AND SWELLING OF LEFT SHOULDER: ICD-10-CM

## 2021-05-14 DIAGNOSIS — R53.83 FATIGUE, UNSPECIFIED TYPE: ICD-10-CM

## 2021-05-14 DIAGNOSIS — R05.3 CHRONIC COUGH: ICD-10-CM

## 2021-05-14 DIAGNOSIS — K21.9 GASTROESOPHAGEAL REFLUX DISEASE WITHOUT ESOPHAGITIS: ICD-10-CM

## 2021-05-14 DIAGNOSIS — Z11.4 SCREENING FOR HIV (HUMAN IMMUNODEFICIENCY VIRUS): ICD-10-CM

## 2021-05-14 DIAGNOSIS — E11.65 TYPE 2 DIABETES MELLITUS WITH HYPERGLYCEMIA, WITHOUT LONG-TERM CURRENT USE OF INSULIN (HCC): ICD-10-CM

## 2021-05-14 DIAGNOSIS — F32.A CHRONIC DEPRESSION: ICD-10-CM

## 2021-05-14 DIAGNOSIS — Z12.11 SCREENING FOR COLON CANCER: ICD-10-CM

## 2021-05-14 DIAGNOSIS — G62.9 NEUROPATHY: ICD-10-CM

## 2021-05-14 DIAGNOSIS — J30.9 ALLERGIC RHINITIS, UNSPECIFIED SEASONALITY, UNSPECIFIED TRIGGER: ICD-10-CM

## 2021-05-14 DIAGNOSIS — I10 ESSENTIAL HYPERTENSION: Primary | ICD-10-CM

## 2021-05-14 LAB
CHP ED QC CHECK: NORMAL
GLUCOSE BLD-MCNC: 183 MG/DL
HEPATITIS C ANTIBODY: NON REACTIVE

## 2021-05-14 PROCEDURE — 73030 X-RAY EXAM OF SHOULDER: CPT

## 2021-05-14 PROCEDURE — G8417 CALC BMI ABV UP PARAM F/U: HCPCS | Performed by: INTERNAL MEDICINE

## 2021-05-14 PROCEDURE — 87389 HIV-1 AG W/HIV-1&-2 AB AG IA: CPT

## 2021-05-14 PROCEDURE — 86803 HEPATITIS C AB TEST: CPT

## 2021-05-14 PROCEDURE — 3017F COLORECTAL CA SCREEN DOC REV: CPT | Performed by: INTERNAL MEDICINE

## 2021-05-14 PROCEDURE — 3023F SPIROM DOC REV: CPT | Performed by: INTERNAL MEDICINE

## 2021-05-14 PROCEDURE — G8427 DOCREV CUR MEDS BY ELIG CLIN: HCPCS | Performed by: INTERNAL MEDICINE

## 2021-05-14 PROCEDURE — 82962 GLUCOSE BLOOD TEST: CPT | Performed by: INTERNAL MEDICINE

## 2021-05-14 PROCEDURE — 36415 COLL VENOUS BLD VENIPUNCTURE: CPT

## 2021-05-14 PROCEDURE — 4004F PT TOBACCO SCREEN RCVD TLK: CPT | Performed by: INTERNAL MEDICINE

## 2021-05-14 PROCEDURE — 3052F HG A1C>EQUAL 8.0%<EQUAL 9.0%: CPT | Performed by: INTERNAL MEDICINE

## 2021-05-14 PROCEDURE — 99214 OFFICE O/P EST MOD 30 MIN: CPT | Performed by: INTERNAL MEDICINE

## 2021-05-14 PROCEDURE — 2022F DILAT RTA XM EVC RTNOPTHY: CPT | Performed by: INTERNAL MEDICINE

## 2021-05-14 PROCEDURE — G8926 SPIRO NO PERF OR DOC: HCPCS | Performed by: INTERNAL MEDICINE

## 2021-05-14 RX ORDER — METFORMIN HYDROCHLORIDE 500 MG/1
500 TABLET, EXTENDED RELEASE ORAL 2 TIMES DAILY
Qty: 30 TABLET | Refills: 3 | Status: SHIPPED | OUTPATIENT
Start: 2021-05-14

## 2021-05-14 RX ORDER — M-VIT,TX,IRON,MINS/CALC/FOLIC 27MG-0.4MG
1 TABLET ORAL DAILY
Qty: 30 TABLET | Refills: 11 | Status: SHIPPED | OUTPATIENT
Start: 2021-05-14 | End: 2022-05-14

## 2021-05-14 RX ORDER — GABAPENTIN 300 MG/1
300 CAPSULE ORAL 3 TIMES DAILY
Qty: 90 CAPSULE | Refills: 3 | Status: SHIPPED | OUTPATIENT
Start: 2021-05-14 | End: 2021-06-13

## 2021-05-14 RX ORDER — FLUTICASONE PROPIONATE 50 MCG
1 SPRAY, SUSPENSION (ML) NASAL DAILY
Qty: 1 BOTTLE | Refills: 3 | Status: SHIPPED | OUTPATIENT
Start: 2021-05-14

## 2021-05-14 RX ORDER — LISINOPRIL 20 MG/1
20 TABLET ORAL DAILY
Qty: 90 TABLET | Refills: 1 | Status: SHIPPED | OUTPATIENT
Start: 2021-05-14 | End: 2021-05-14 | Stop reason: ALTCHOICE

## 2021-05-14 RX ORDER — CITALOPRAM 20 MG/1
20 TABLET ORAL DAILY
Qty: 30 TABLET | Refills: 3 | Status: SHIPPED | OUTPATIENT
Start: 2021-05-14

## 2021-05-14 RX ORDER — METFORMIN HYDROCHLORIDE 500 MG/1
500 TABLET, EXTENDED RELEASE ORAL
Qty: 30 TABLET | Refills: 3 | Status: SHIPPED | OUTPATIENT
Start: 2021-05-14 | End: 2021-05-14 | Stop reason: SDUPTHER

## 2021-05-14 RX ORDER — NAPROXEN 375 MG/1
375 TABLET ORAL 2 TIMES DAILY WITH MEALS
Qty: 60 TABLET | Refills: 5 | Status: SHIPPED | OUTPATIENT
Start: 2021-05-14

## 2021-05-14 RX ORDER — OMEPRAZOLE 20 MG/1
20 CAPSULE, DELAYED RELEASE ORAL DAILY
Qty: 30 CAPSULE | Refills: 3 | Status: SHIPPED | OUTPATIENT
Start: 2021-05-14

## 2021-05-14 RX ORDER — ALBUTEROL SULFATE 90 UG/1
2 AEROSOL, METERED RESPIRATORY (INHALATION) EVERY 6 HOURS PRN
Qty: 1 INHALER | Refills: 3 | Status: SHIPPED | OUTPATIENT
Start: 2021-05-14

## 2021-05-14 RX ORDER — BUPROPION HYDROCHLORIDE 150 MG/1
150 TABLET, EXTENDED RELEASE ORAL DAILY
Qty: 30 TABLET | Refills: 3 | Status: SHIPPED | OUTPATIENT
Start: 2021-05-14

## 2021-05-14 RX ORDER — LOSARTAN POTASSIUM 50 MG/1
50 TABLET ORAL DAILY
Qty: 30 TABLET | Refills: 3 | Status: SHIPPED | OUTPATIENT
Start: 2021-05-14

## 2021-05-14 RX ORDER — DULAGLUTIDE 1.5 MG/.5ML
1.5 INJECTION, SOLUTION SUBCUTANEOUS WEEKLY
Qty: 4 PEN | Refills: 3 | Status: SHIPPED | OUTPATIENT
Start: 2021-05-14

## 2021-05-14 RX ORDER — ATORVASTATIN CALCIUM 20 MG/1
20 TABLET, FILM COATED ORAL DAILY
Qty: 30 TABLET | Refills: 3 | Status: SHIPPED | OUTPATIENT
Start: 2021-05-14

## 2021-05-14 NOTE — PROGRESS NOTES
Name: Daniel Valdes  [id number]  Age: 60 y.o. YOB: 1965  Sex: male    CHIEF COMPLAINT:    Chief Complaint   Patient presents with    Diabetes    Hypertension    Other     other chonic contions       HISTORY OF PRESENT ILLNESS:     This is a pleasant  60 y.o. male  is seen today for management of chronic medical problems and medications refills. Previous records reviewed . Doing OK . Denies CP or SOB. C/O dry hacking cough for long time. Did not see lung doctor. No fever , sore throat or cough or congestion. Still smokes @ 1/2 PPD. . Trying to cut back on his own. Denies any abdominal pain. .  Gastritis symptoms are better appetite OK. Bowels moving [id number] No urinary symptoms. C/O significant pain on his left upper arm. Also C/O burning sensation. Paraesthesia feet improved. Hearing is ok. Vision Ok with glasses. Denies  any significant skin lesions. Denies any significant depression or anxiety. .  Celexa and Wellbutrin helps his depression  Does not check sugars at home. Ingrid Milligan He has diabetic machine and supplies at home. No other complaints. Did not get Covid vaccine yet and refuses to take one.     Past Medical History:    Patient Active Problem List   Diagnosis    Type 2 diabetes mellitus with hyperglycemia, without long-term current use of insulin ([address]    Essential hypertension    Mixed hyperlipidemia    Allergic rhinitis    Chronic obstructive pulmonary disease (HCC)    Chronic depression    Need for Tdap vaccination    Neuropathy    Fatigue    Dorsalgia    Chronic cough    Encounter for hepatitis C screening test for low risk patient    Pain and swelling of left shoulder    Gastroesophageal reflux disease without esophagitis        Past Surgical History:        Procedure Laterality Date    KNEE CARTILAGE SURGERY  January 2012       Social History:   Social History     Tobacco Use    Smoking status: Current Every Day Smoker     Packs/day: 1.00     Years: 45.00     Pack years: 45.00     Types: Cigarettes     Start date: 2/11/1971    Smokeless tobacco: Never Used   Substance Use Topics    Alcohol use: Yes     Comment: occasional       Family History:       Problem Relation Age of Onset    Cancer Mother     High Blood Pressure Mother     Diabetes Mother     Diabetes Father        Allergies:  Vicodin [hydrocodone-acetaminophen]    Current Medications :      Prior to Admission medications    Medication Sig Start Date End Date Taking? Authorizing Provider   atorvastatin (LIPITOR) 20 MG tablet Take 1 tablet by mouth daily 5/14/21  Yes Nani Giang MD   buPROPion [facility] SR) 150 MG extended release tablet Take 1 tablet by mouth daily 5/14/21  Yes Nani Giang MD   citalopram (CELEXA) 20 MG tablet Take 1 tablet by mouth daily 5/14/21  Yes Nani Giang MD   Dulaglutide (TRULICITY) 1.5 IY/4.4CJ SOPN Inject 1.5 mg into the skin once a week 5/14/21  Yes Nani Giang MD   fluticasone (FLONASE) 50 MCG/ACT nasal spray 1 spray by Each Nostril route daily 5/14/21  Yes Nani Giang MD   gabapentin (NEURONTIN) 300 MG capsule Take 1 capsule by mouth 3 times daily for 30 days.  5/14/21 6/13/21 Yes Nani Giang MD   mometasone-formoterol [facility]) 100-5 MCG/ACT inhaler Inhale 2 puffs into the lungs 2 times daily 5/14/21  Yes Nani Giang MD   Multiple Vitamins-Minerals (THERAPEUTIC MULTIVITAMIN-MINERALS) tablet Take 1 tablet by mouth daily 5/14/21 5/14/22 Yes Nani Giang MD   naproxen (NAPROSYN) 375 MG tablet Take 1 tablet by mouth 2 times daily (with meals) 5/14/21  Yes Nani Giang MD   omeprazole (PRILOSEC) 20 MG delayed release capsule Take 1 capsule by mouth daily 5/14/21  Yes Nani Giang MD   tiotropium (SPIRIVA RESPIMAT) 2.5 MCG/ACT AERS inhaler Inhale 2 puffs into the lungs daily 5/14/21  Yes Nani Giang MD   albuterol sulfate HFA (VENTOLIN HFA) 108 (90 Base) MCG/ACT inhaler Inhale 2 puffs into the lungs every 6 hours as needed for Wheezing 5/14/21  Yes Nani Giang MD ASSESSMENT/PLAN:    1. Essential hypertension  Continue current medications, denies side effect with medicationss. Low salt diet and exercise advised. Will DC lisinopril because of the cough and is start him on Cozaar 50 mg daily  - Comprehensive Metabolic Panel  - CBC Auto Differential  - losartan (COZAAR) 50 MG tablet; Take 1 tablet by mouth daily  Dispense: 30 tablet; Refill: 3    2. Mixed hyperlipidemia  Patient is taking cholesterol medications regularly. Denies any side effects. Diet and exercise advised. Continue Lipitor  - atorvastatin (LIPITOR) 20 MG tablet; Take 1 tablet by mouth daily  Dispense: 30 tablet; Refill: 3  - Lipid, Fasting    3. Type 2 diabetes mellitus with hyperglycemia, without long-term current use of insulin (HCC)  Advised low sugar diet and exercise. Increase Glucophage XR to 1 twice daily. Continue on Trulicity  - POCT Glucose  - Microalbumin / Creatinine Urine Ratio  - Hemoglobin A1C  - metFORMIN (GLUCOPHAGE-XR) 500 MG extended release tablet; Take 1 tablet by mouth 2 times daily  Dispense: 30 tablet; Refill: 3    4. Chronic obstructive pulmonary disease, unspecified COPD type ([address]  Continue inhalers and refer him to pulmonologist.  - mometasone-formoterol (DULERA) 100-5 MCG/ACT inhaler; Inhale 2 puffs into the lungs 2 times daily  Dispense: 1 Inhaler; Refill: 3  - tiotropium (SPIRIVA RESPIMAT) 2.5 MCG/ACT AERS inhaler; Inhale 2 puffs into the lungs daily  Dispense: 1 Inhaler; Refill: 3  - albuterol sulfate HFA (VENTOLIN HFA) 108 (90 Base) MCG/ACT inhaler; Inhale 2 puffs into the lungs every 6 hours as needed for Wheezing  Dispense: 1 Inhaler; Refill: 3    5. Allergic rhinitis, unspecified seasonality, unspecified trigger  Continue Singulair and advised take Claritin OTC as needed  - fluticasone (FLONASE) 50 MCG/ACT nasal spray; 1 spray by Each Nostril route daily  Dispense: 1 Bottle; Refill: 3    6. Dorsalgia  Continue naproxen and Tylenol as needed.   - naproxen (NAPROSYN) 375 MG tablet; Take 1 tablet by mouth 2 times daily (with meals)  Dispense: 60 tablet; Refill: 5    7. Neuropathy  Continue gabapentin  - gabapentin (NEURONTIN) 300 MG capsule; Take 1 capsule by mouth 3 times daily for 30 days. Dispense: 90 capsule; Refill: 3    8. Chronic depression  Patient on Celexa and Wellbutrin  - buPROPion (WELLBUTRIN SR) 150 MG extended release tablet; Take 1 tablet by mouth daily  Dispense: 30 tablet; Refill: 3  - citalopram (CELEXA) 20 MG tablet; Take 1 tablet by mouth daily  Dispense: 30 tablet; Refill: 3    9. Fatigue, unspecified type  Better. - Multiple Vitamins-Minerals (THERAPEUTIC MULTIVITAMIN-MINERALS) tablet; Take 1 tablet by mouth daily  Dispense: 30 tablet; Refill: 11    10. Chronic cough:  Lisinopril discontinued and Cozaar restarted. Advised strongly to quit smoking. 11. Gastroesophageal reflux disease without esophagitis  Continue Prilosec  - omeprazole (PRILOSEC) 20 MG delayed release capsule; Take 1 capsule by mouth daily  Dispense: 30 capsule; Refill: 3    12. Pain and swelling of left shoulder  Continue naproxen and Tylenol as needed and referred to orthopod. Check x-ray of the left shoulder  - [facility], DO, Orthopedic Surgery, [place]  - XR SHOULDER LEFT (MIN 2 VIEWS); Future    13. Encounter for hepatitis C screening test for low risk patient.- Hepatitis C Antibody; Future    14. Screening for HIV (human immunodeficiency virus)  - HIV-1 AND HIV-2 ANTIBODIES; Future    15. Screening for colon cancer  - Colecho (For External Results Only); Future    I have recommended that the patient follow CDC guidelines for prevention of COVID-19 infection. I also discussed Coronavirus precaution including wearing face mask, handwashing practice, wiping items touched in public such as gas pumps, door handles, shopping carts, etc. Also Self monitoring for infection - fever, chills, cough, SOB.  Should he/she develop symptoms he/she should call office or go to ER for further instructions. Care discussed with patient. Questions answered and patient verbalizes understanding and agrees with plan. Medications reviewed and reconciled. Continue current medications. Appropriate prescriptions are ordered. Risks and benefits of meds are discussed. After visit summary provided. Advised to call for any problems, questions, or concerns. If symptoms worsen or don't improve as expected, to call us or go to ER. Follow up as directed, sooner if needed. Return in about 3 months (around 8/14/2021). This dictation was performed with a verbal recognition program and it was checked for errors. It is possible that there are still dictated errors within this office note. Any errors should be brought immediately to my attention for correction. All efforts were made to ensure that this office note is accurate.      Brielle Benavides MD

## 2021-05-15 LAB — HIV SCREEN: NON REACTIVE

## 2021-06-13 PROBLEM — Z11.59 ENCOUNTER FOR HEPATITIS C SCREENING TEST FOR LOW RISK PATIENT: Status: RESOLVED | Noted: 2021-05-14 | Resolved: 2021-06-13

## 2021-06-14 ENCOUNTER — INITIAL CONSULT (OUTPATIENT)
Dept: PULMONOLOGY | Age: 56
End: 2021-06-14
Payer: COMMERCIAL

## 2021-06-14 VITALS
WEIGHT: 250 LBS | HEART RATE: 94 BPM | SYSTOLIC BLOOD PRESSURE: 126 MMHG | OXYGEN SATURATION: 94 % | DIASTOLIC BLOOD PRESSURE: 78 MMHG | BODY MASS INDEX: 32.08 KG/M2 | HEIGHT: 74 IN

## 2021-06-14 DIAGNOSIS — J42 CHRONIC BRONCHITIS, UNSPECIFIED CHRONIC BRONCHITIS TYPE (HCC): Primary | ICD-10-CM

## 2021-06-14 DIAGNOSIS — J42 CHRONIC BRONCHITIS, UNSPECIFIED CHRONIC BRONCHITIS TYPE (HCC): ICD-10-CM

## 2021-06-14 DIAGNOSIS — R05.3 CHRONIC COUGH: ICD-10-CM

## 2021-06-14 DIAGNOSIS — Z72.0 TOBACCO ABUSE: ICD-10-CM

## 2021-06-14 DIAGNOSIS — G47.19 EXCESSIVE DAYTIME SLEEPINESS: ICD-10-CM

## 2021-06-14 DIAGNOSIS — R06.09 DYSPNEA ON EXERTION: ICD-10-CM

## 2021-06-14 LAB
EXPIRATORY TIME-POST: NORMAL
EXPIRATORY TIME: NORMAL
FEF 25-75% %CHNG: NORMAL
FEF 25-75% %PRED-POST: NORMAL
FEF 25-75% %PRED-PRE: NORMAL
FEF 25-75% PRED: NORMAL
FEF 25-75%-POST: NORMAL
FEF 25-75%-PRE: NORMAL
FEV1 %PRED-POST: 64.7 %
FEV1 %PRED-PRE: 58.4 %
FEV1 PRED: 4.2 L
FEV1-POST: 2.71 L
FEV1-PRE: 2.45 L
FEV1/FVC %PRED-POST: 102.1 %
FEV1/FVC %PRED-PRE: 106.9 %
FEV1/FVC PRED: 76 %
FEV1/FVC-POST: 77.6 %
FEV1/FVC-PRE: 81.3 %
FVC %PRED-POST: 63.3 L
FVC %PRED-PRE: 56.4 %
FVC PRED: 5.52 L
FVC-POST: 3.5 L
FVC-PRE: 3.02 L
PEF %PRED-POST: NORMAL
PEF %PRED-PRE: NORMAL
PEF PRED: NORMAL
PEF%CHNG: NORMAL
PEF-POST: NORMAL
PEF-PRE: NORMAL

## 2021-06-14 PROCEDURE — 3017F COLORECTAL CA SCREEN DOC REV: CPT | Performed by: INTERNAL MEDICINE

## 2021-06-14 PROCEDURE — G8427 DOCREV CUR MEDS BY ELIG CLIN: HCPCS | Performed by: INTERNAL MEDICINE

## 2021-06-14 PROCEDURE — G8926 SPIRO NO PERF OR DOC: HCPCS | Performed by: INTERNAL MEDICINE

## 2021-06-14 PROCEDURE — 3023F SPIROM DOC REV: CPT | Performed by: INTERNAL MEDICINE

## 2021-06-14 PROCEDURE — 4004F PT TOBACCO SCREEN RCVD TLK: CPT | Performed by: INTERNAL MEDICINE

## 2021-06-14 PROCEDURE — 99204 OFFICE O/P NEW MOD 45 MIN: CPT | Performed by: INTERNAL MEDICINE

## 2021-06-14 PROCEDURE — G8417 CALC BMI ABV UP PARAM F/U: HCPCS | Performed by: INTERNAL MEDICINE

## 2021-06-14 ASSESSMENT — PULMONARY FUNCTION TESTS
FEV1/FVC_PERCENT_PREDICTED_POST: 102.1
FVC_PERCENT_PREDICTED_PRE: 56.4
FEV1_POST: 2.71
FVC_PRE: 3.02
FEV1_PERCENT_PREDICTED_POST: 64.7
FEV1_PRE: 2.45
FEV1/FVC_POST: 77.6
FVC_POST: 3.50
FEV1/FVC_PREDICTED: 76.0
FEV1_PREDICTED: 4.20
FEV1_PERCENT_PREDICTED_PRE: 58.4
FEV1/FVC_PERCENT_PREDICTED_PRE: 106.9
FVC_PERCENT_PREDICTED_POST: 63.3
FEV1/FVC_PRE: 81.3
FVC_PREDICTED: 5.52

## 2021-06-14 ASSESSMENT — SLEEP AND FATIGUE QUESTIONNAIRES
HOW LIKELY ARE YOU TO NOD OFF OR FALL ASLEEP WHILE WATCHING TV: 3
HOW LIKELY ARE YOU TO NOD OFF OR FALL ASLEEP WHILE LYING DOWN TO REST IN THE AFTERNOON WHEN CIRCUMSTANCES PERMIT: 3
HOW LIKELY ARE YOU TO NOD OFF OR FALL ASLEEP WHILE SITTING INACTIVE IN A PUBLIC PLACE: 3
HOW LIKELY ARE YOU TO NOD OFF OR FALL ASLEEP WHEN YOU ARE A PASSENGER IN A CAR FOR AN HOUR WITHOUT A BREAK: 3
HOW LIKELY ARE YOU TO NOD OFF OR FALL ASLEEP IN A CAR, WHILE STOPPED FOR A FEW MINUTES IN TRAFFIC: 0
HOW LIKELY ARE YOU TO NOD OFF OR FALL ASLEEP WHILE SITTING AND READING: 1
HOW LIKELY ARE YOU TO NOD OFF OR FALL ASLEEP WHILE SITTING QUIETLY AFTER LUNCH WITHOUT ALCOHOL: 3
HOW LIKELY ARE YOU TO NOD OFF OR FALL ASLEEP WHILE SITTING AND TALKING TO SOMEONE: 2
ESS TOTAL SCORE: 18
NECK CIRCUMFERENCE (INCHES): 19.5

## 2021-06-14 NOTE — PROGRESS NOTES
Subjective:   CHIEF COMPLAINT / HPI: Dyspnea exertion, COPD, chronic cough, tobacco abuse, excessive daytime sleepiness, family history of lung cancer  Cyndi Seay is a 53-year-old male who has been referred here for evaluation of his COPD and chronic cough. He currently is on Margareth Mins and has an albuterol rescue inhaler to use as needed. He states that he does get frequent episodes of bronchitis. Although he has not had the COVID-19 vaccination he also denies a history of COVID-19 exposure or infection. He mentions that he started smoking at age of 4 and at one point was smoking up to 5 packs a day. Just over the past 6 months he has been able to cut back to 1 pack a day. He estimates that he smoked somewhere between 2 to 3 packs a day for almost 50 years. He mentions that he was a heavy alcohol user in the past but also has cut back. He states that  he cracked a rib and had a collapsed lung. His cough is nonspecific and seems to be mostly nonproductive but he does produce some sputum in the mornings. He denies hemoptysis and is not having any chest pain. He mentions that his twin brother  of lung cancer. Office spirometry demonstrates an FEV1 of 2.45 L with an FVC of 3.02 L. Because his FEV1 and FVC are both reduced I cannot rule out a restrictive lung process without further testing. His flow volume loop does suggest small airways disease or perhaps minimal COPD. Following bronchodilators he did have a significant and almost asthmatic-like response  Past Medical History:  Past Medical History:   Diagnosis Date    Diabetes mellitus ([facility] [facility]     Dyspnea on exertion 2021    Excessive daytime sleepiness 2021    Hyperlipidemia     Hypertension     Tobacco abuse 2021       Current Medications:    No current facility-administered medications for this visit.     Allergies   Allergen Reactions    Vicodin [Hydrocodone-Acetaminophen] Rash       Social History: hearing loss,  sinus pressure, epistaxis   RESPIRATORY:  See HPI  CARDIOVASCULAR:  negative for chest pain, palpitations, exertional chest pressure/discomfort, edema, syncope  GASTROINTESTINAL: negative for nausea, vomiting, diarrhea, constipation, blood in stool and abdominal pain, positive for GERD  GENITOURINARY:  negative for frequency, dysuria and hematuria  HEMATOLOGIC/LYMPHATIC:  negative for easy bruising, bleeding and lymphadenopathy  ALLERGIC/IMMUNOLOGIC:  negative for recurrent infections, angioedema, anaphylaxis and drug reaction  MUSCULOSKELETAL:  negative for  pain, joint swelling, decreased range of motion and muscle weakness  NEURO: negative for chronic headaches,seizures,TIA,CVA  SKIN: negative for rash,pruritis    Objective:   PHYSICAL EXAM:      VITALS:    Vitals:    06/14/21 0903   BP: 126/78   Pulse: 94   SpO2: 94%   Weight: 250 lb (113.4 kg)   Height: 6' 2\" (1.88 m)   Mallampati IIII  [place] sleepiness scale18  Neck circumference 19.5 inches  BMI 32.10    CONSTITUTIONAL:  awake, alert, cooperative, no apparent distress, and appears stated age mildly overweight  HEENT:  supple and nontender,  trachea midline, no adenopathy, thyroid nl, no JVD, no wheezing or stridor over neck, increased neck girth  CHEST: chest expansion equal and symmetrical, no intercostal retraction, no increased work of breathing  LUNGS: Breath sounds are coarse throughout all areas with a few scattered rhonchi in the mid to lower lung fields. No wheezing is noted  CARDIOVASCULAR: normal S1 and S2 , no murmurs or gallops ,no pericardial rubs  ABDOMEN:  normal bowel sounds, non-distended and no masses palpated, and no tenderness to palpation. No hepatosplenomegaly  LYMPHADENOPATHY:  no axillary or supraclavicular adenopathy. No cervical adenopathy  EXTREMITIES: no edema, no inflammation, no tenderness.   NEURO: oriented X 3, No focal deficits  SKIN: no rashes, No lesions    RADIOLOGY: No chest x-ray available    PFT: Office spirometry demonstrates an FEV1 of 2.45 L with an FVC of 3.02 L. Because his FEV1 and FVC are both reduced I cannot rule out a restrictive lung process without further testing. His flow volume loop does suggest small airways disease or perhaps minimal COPD. Following bronchodilators he did have a significant and almost asthmatic-like response  Home oxygen evaluation:  O2 saturation on room air at rest94%  O2 saturation on room air with ambulation95%  Assessment:     1. Chronic bronchitis, unspecified chronic bronchitis type ([address]    2. Dyspnea on exertion    3. Tobacco abuse    4. Excessive daytime sleepiness    5. Chronic cough        Plan:   I will make no change in his current bronchodilator therapy. We spoke about smoking cessation and the importance of doing so as soon as possible. We spoke in detail about smoking cessation strategies. I will obtain a nighttime oxygen saturation study to see if he qualifies for home oxygen. Because of his history of snoring, excessive daytime sleepiness I will obtain apnea screening. Also because of his family history of lung cancer I will obtain a routine chest x-ray and consider a low-dose CT lung screening in the future. I did encourage him to get the COVID-19 vaccination as soon as possible. I would recommend a complete cardiac evaluation in the near future. --  I will continue to follow him    We have discussed the need to maintain yearly flu immunization, pneumococcal vaccination. We have discussed Coronavirus precaution including handwashing practice, wiping items touched in public such as gas pumps, door handles, shopping carts, etc. Self monitoring for infection - fever, chills, cough, SOB. Should they develop symptoms they should call office for further instructions. Return in about 6 weeks (around 7/26/2021) for Recheck for Cough, Recheck for COPD, Recheck for Shortness of Breath.     This dictation was performed with a verbal recognition program and it was checked for errors. It is possible that there are still dictated errors within this office note. Any errors should be brought immediately to my attention for correction. All efforts were made to ensure that this office note is accurate.

## 2021-06-28 ENCOUNTER — HOSPITAL ENCOUNTER (EMERGENCY)
Age: 56
Discharge: LEFT BEFORE DISCHARGE | End: 2021-06-28
Attending: EMERGENCY MEDICINE
Payer: COMMERCIAL

## 2021-06-28 VITALS
RESPIRATION RATE: 17 BRPM | DIASTOLIC BLOOD PRESSURE: 90 MMHG | SYSTOLIC BLOOD PRESSURE: 171 MMHG | TEMPERATURE: 98.2 F | OXYGEN SATURATION: 96 % | HEART RATE: 85 BPM

## 2021-06-28 DIAGNOSIS — R61 DIAPHORESIS: Primary | ICD-10-CM

## 2021-06-28 LAB
EKG ATRIAL RATE: 81 BPM
EKG DIAGNOSIS: NORMAL
EKG P AXIS: 26 DEGREES
EKG P-R INTERVAL: 142 MS
EKG Q-T INTERVAL: 404 MS
EKG QRS DURATION: 124 MS
EKG QTC CALCULATION (BAZETT): 469 MS
EKG R AXIS: -50 DEGREES
EKG T AXIS: 20 DEGREES
EKG VENTRICULAR RATE: 81 BPM

## 2021-06-28 PROCEDURE — 93005 ELECTROCARDIOGRAM TRACING: CPT | Performed by: EMERGENCY MEDICINE

## 2021-06-28 PROCEDURE — 93010 ELECTROCARDIOGRAM REPORT: CPT | Performed by: INTERNAL MEDICINE

## 2021-06-28 PROCEDURE — 99284 EMERGENCY DEPT VISIT MOD MDM: CPT

## 2021-06-28 ASSESSMENT — ENCOUNTER SYMPTOMS
EYE PAIN: 0
SHORTNESS OF BREATH: 0
BACK PAIN: 0
EYE DISCHARGE: 0
NAUSEA: 0
ABDOMINAL PAIN: 0
COUGH: 0
VOMITING: 0
SINUS PAIN: 0
SORE THROAT: 0

## 2021-06-28 NOTE — ED PROVIDER NOTES
chest pain, palpitations and leg swelling. Gastrointestinal: Negative for abdominal pain, nausea and vomiting. Endocrine: Negative for polydipsia and polyuria. Genitourinary: Negative for dysuria and flank pain. Musculoskeletal: Negative for back pain and neck pain. Skin: Negative for pallor and wound. Neurological: Negative for dizziness, facial asymmetry, light-headedness, numbness and headaches. Psychiatric/Behavioral: Negative for confusion. Except as noted above the remainder of the review of systems was reviewed and negative. PAST MEDICAL HISTORY     Past Medical History:   Diagnosis Date    Diabetes mellitus ([address]     Dyspnea on exertion 6/14/2021    Excessive daytime sleepiness 6/14/2021    Hyperlipidemia     Hypertension     Tobacco abuse 6/14/2021       Prior to Admission medications    Medication Sig Start Date End Date Taking? Authorizing Provider   atorvastatin (LIPITOR) 20 MG tablet Take 1 tablet by mouth daily 5/14/21   Demond Palomino MD   buPROPion [facility]) 150 MG extended release tablet Take 1 tablet by mouth daily 5/14/21   Demond Palomino MD   citalopram (CELEXA) 20 MG tablet Take 1 tablet by mouth daily 5/14/21   Demond Palomino MD   Dulaglutide (TRULICITY) 1.5 RN/5.3KP SOPN Inject 1.5 mg into the skin once a week 5/14/21   Demond Palomino MD   fluticasone [employer]) 50 MCG/ACT nasal spray 1 spray by Each Nostril route daily 5/14/21   Demond Palomino MD   gabapentin (NEURONTIN) 300 MG capsule Take 1 capsule by mouth 3 times daily for 30 days.  5/14/21 6/13/21  Demond Palomino MD   mometasone-formoterol [facility]) 100-5 MCG/ACT inhaler Inhale 2 puffs into the lungs 2 times daily 5/14/21   Demond Palomino MD   Multiple Vitamins-Minerals (THERAPEUTIC MULTIVITAMIN-MINERALS) tablet Take 1 tablet by mouth daily 5/14/21 5/14/22  Demond Palomino MD   naproxen (NAPROSYN) 375 MG tablet Take 1 tablet by mouth 2 times daily (with meals) 5/14/21   Demond Paolmino MD   omeprazole ([address] [place] [address] Avenue) 20 MG delayed release capsule Take 1 capsule by mouth daily 5/14/21   Chelsea Shah MD   tiotropium (SPIRIVA RESPIMAT) 2.5 MCG/ACT AERS inhaler Inhale 2 puffs into the lungs daily 5/14/21   Chelsea Shah MD   albuterol sulfate HFA (VENTOLIN HFA) 108 (90 Base) MCG/ACT inhaler Inhale 2 puffs into the lungs every 6 hours as needed for Wheezing 5/14/21   Chelsea Shah MD   metFORMIN (GLUCOPHAGE-XR) 500 MG extended release tablet Take 1 tablet by mouth 2 times daily 5/14/21   Chelsea Shah MD   losartan (COZAAR) 50 MG tablet Take 1 tablet by mouth daily 5/14/21   Chelsea Shah MD   MUCUS RELIEF  MG extended release tablet take 1 tablet by mouth twice a day for 15 DAYS 5/3/21   Chelsea Shah MD   blood glucose monitor kit and supplies 1 kit by Other route 3 times daily Dispense sufficient amount (tests 3 times daily) for indicated testing frequency. Dispense all needed supplies to include: monitor, strips, lancing device, lancets, control solutions, and alcohol swabs. Dispense enough for a 30 day supply with 0 refills.  2/12/21   Chelsea Shah MD        Patient Active Problem List   Diagnosis    Type 2 diabetes mellitus with hyperglycemia, without long-term current use of insulin ([facility]    Essential hypertension    Mixed hyperlipidemia    Allergic rhinitis    Chronic obstructive pulmonary disease (HCC)    Chronic depression    Need for Tdap vaccination    Neuropathy    Fatigue    Dorsalgia    Chronic cough    Pain and swelling of left shoulder    Gastroesophageal reflux disease without esophagitis    Dyspnea on exertion    Tobacco abuse    Excessive daytime sleepiness         SURGICAL HISTORY       Past Surgical History:   Procedure Laterality Date    KNEE CARTILAGE SURGERY  January 2012         CURRENT MEDICATIONS       Previous Medications    ALBUTEROL SULFATE HFA (VENTOLIN HFA) 108 (90 BASE) MCG/ACT INHALER    Inhale 2 puffs into the lungs every 6 hours as needed for Wheezing    ATORVASTATIN file    Years of education: Not on file    Highest education level: Not on file   Occupational History    Not on file   Tobacco Use    Smoking status: Current Every Day Smoker     Packs/day: 1.00     Years: 45.00     Pack years: 45.00     Types: Cigarettes     Start date: 2/11/1971    Smokeless tobacco: Never Used   Vaping Use    Vaping Use: Never used   Substance and Sexual Activity    Alcohol use: Yes     Comment: occasional    Drug use: No    Sexual activity: Not Currently   Other Topics Concern    Not on file   Social History Narrative    Not on file     Social Determinants of Health     Financial Resource Strain: Medium Risk    Difficulty of Paying Living Expenses: Somewhat hard   Food Insecurity: Food Insecurity Present    Worried About Running Out of Food in the Last Year: Sometimes true    Niko of Food in the Last Year: Sometimes true   Transportation Needs:     Lack of Transportation (Medical):  Lack of Transportation (Non-Medical):    Physical Activity:     Days of Exercise per Week:     Minutes of Exercise per Session:    Stress:     Feeling of Stress :    Social Connections:     Frequency of Communication with Friends and Family:     Frequency of Social Gatherings with Friends and Family:     Attends Sabianist Services:     Active Member of Clubs or Organizations:     Attends Club or Organization Meetings:     Marital Status:    Intimate Partner Violence:     Fear of Current or Ex-Partner:     Emotionally Abused:     Physically Abused:     Sexually Abused:        SCREENINGS               PHYSICAL EXAM    (up to 7 for level 4, 8 or more for level 5)     ED Triage Vitals [06/28/21 0622]   BP Temp Temp Source Pulse Resp SpO2 Height Weight   (!) 171/90 98.2 °F (36.8 °C) Oral 85 17 96 % -- --       Physical Exam  Vitals reviewed. Constitutional:       Appearance: He is not ill-appearing or toxic-appearing. HENT:      Head: Normocephalic and atraumatic.       Nose: No congestion or rhinorrhea. Mouth/Throat:      Mouth: Mucous membranes are moist.      Pharynx: No oropharyngeal exudate or posterior oropharyngeal erythema. Eyes:      General:         Right eye: No discharge. Left eye: No discharge. Extraocular Movements: Extraocular movements intact. Pupils: Pupils are equal, round, and reactive to light. Cardiovascular:      Rate and Rhythm: Normal rate. Heart sounds: No friction rub. No gallop. Pulmonary:      Effort: Pulmonary effort is normal. No respiratory distress. Chest:      Chest wall: No tenderness. Abdominal:      Palpations: Abdomen is soft. Tenderness: There is no abdominal tenderness. There is no guarding. Musculoskeletal:         General: No swelling or tenderness. Normal range of motion. Cervical back: Normal range of motion. No rigidity or tenderness. Skin:     General: Skin is warm. Capillary Refill: Capillary refill takes less than 2 seconds. Findings: No erythema, lesion or rash. Neurological:      General: No focal deficit present. Mental Status: He is alert and oriented to person, place, and time. DIAGNOSTIC RESULTS     Labs Reviewed - No data to display       EKG: All EKG's are interpreted by the Emergency Department Physician who either signs or Co-signs this chart in the absence of a cardiologist.       EKG Interpretation    Interpreted by emergency department physician    EKG is interpreted by me. EKG shows sinus rhythm at 81 bpm, left axis deviation, no remarkable ST segmentations or depressions, there are some flattened T waves in lateral leads, DC interval 142, QRS ration 124, QTC of 4-69. Final impression, nonspecific EKG, QRS widening. Justin Flores MD     RADIOLOGY:     Non-plain film images such as CT, Ultrasound and MRI are read by the radiologist. Plain radiographic images are visualized and preliminarily interpreted by the emergency physician. Interpretation per the Radiologist below, if available at the time of this note:    No orders to display         ED BEDSIDE ULTRASOUND:   Performed by ED Physician Marylene Schlatter, MD       LABS:  Labs Reviewed - No data to display    All other labs were within normal range or not returned as of this dictation. EMERGENCY DEPARTMENT COURSE and DIFFERENTIAL DIAGNOSIS/MDM:   Vitals:    Vitals:    06/28/21 0622   BP: (!) 171/90   Pulse: 85   Resp: 17   Temp: 98.2 °F (36.8 °C)   TempSrc: Oral   SpO2: 96%           MDM  Number of Diagnoses or Management Options  Diaphoresis  Diagnosis management comments: 49-year-old male presents for evaluation after reported episode of diaphoresis. He is brought by EMS. EMS notes when they were called to scene he was diaphoretic. They took an EKG which was transmitted to us. That EKG did not seem to show any acute issues. Patient reports that he simply went outside to smoke a cigarette and he choked. Coworker called EMS. He presents for evaluation. He endorses a history of hypertension but denies any history of CAD. Presents with elevated blood pressure. Vitals otherwise unremarkable. He states he is asymptomatic. He does not wish for any further evaluation the emergency department. He declines any blood draws. I did obtain a repeat EKG which was overall nondiagnostic showing no acute signs of ischemia or arrhythmia. At this time he is asymptomatic. I did discuss that without further testing is evaluation is very premature. He demonstrates understanding of his symptoms and shows appropriate capacity declined further evaluation. He is discharged [address] He is given referral to cardiology. Instructed to follow-up outpatient and to return for any worsening concerning symptoms.      -  Patient seen and evaluated in the emergency department. -  Triage and nursing notes reviewed and incorporated.   -  Old chart records reviewed and incorporated. -  Work-up included:  See above  -  Results discussed with patient. CONSULTS:  None    PROCEDURES:  None performed unless otherwise noted below     Procedures        FINAL IMPRESSION      1. Diaphoresis          DISPOSITION/PLAN   DISPOSITION [place] 06/28/2021 06:28:54 AM      PATIENT REFERRED TO:  Guillermo Dent MD  [address] [place] [address]  [phone]    Schedule an appointment as soon as possible for a visit in 2 days      Rocky Couch MD  [address]  [phone]            DISCHARGE MEDICATIONS:  New Prescriptions    No medications on file       ED Provider Disposition Time  DISPOSITION [place] 06/28/2021 06:28:54 AM      Appropriate personal protective equipment was worn during the patient's evaluation. These included surgical, eye protection, surgical mask or in 95 respirator and gloves. The patient was also placed in a surgical mask for the prevention of possible spread of respiratory viral illnesses. The Patient was instructed to read the package inserts with any medication that was prescribed. Major potential reactions and medication interactions were discussed. The Patient understands that there are numerous possible adverse reactions not covered. The patient was also instructed to arrange follow-up with his or her primary care provider for review of any pending labwork or incidental findings on any radiology results that were obtained. All efforts were made to discuss any incidental findings that require further monitoring. Controlled Substances Monitoring:     No flowsheet data found.     (Please note that portions of this note were completed with a voice recognition program.  Efforts were made to edit the dictations but occasionally words are mis-transcribed.)    Sohail Bassett MD (electronically signed)  Attending Emergency Physician           Sohail Bassett MD  06/28/21 8714

## 2021-06-28 NOTE — ED TRIAGE NOTES
Arrived as a possible cardiac alert per [employer]. EMS states they arrived to find pt pale, diaphoretic, and unable to answer questions at work. Per pt he states he started choking on cigarette smoke. Pt states he does not want to be here at the hospital, he does not want blood work drawn, but he is allowing for vitals and repeat EKG. Pt intermittently notes L arm pain and head pain but states he does not want worked up. Dr. Fernando Dawson at bedside talking to pt and AMA mentioned to pt if he so chooses but he states he does not have a ride. EMS provided with 324mg of aspirin and one nitro but refused any IV en route. Pt has healing abrasion to the L side of his face on his forehead, cheek, and nose stating it is from a recent fall on carpet.

## 2021-06-28 NOTE — ED NOTES
Bed: ED-26  Expected date:   Expected time:   Means of arrival:   Comments:  [address]  06/28/21 2611

## 2021-06-29 ENCOUNTER — APPOINTMENT (OUTPATIENT)
Dept: CT IMAGING | Age: 56
End: 2021-06-29
Payer: COMMERCIAL

## 2021-06-29 ENCOUNTER — APPOINTMENT (OUTPATIENT)
Dept: GENERAL RADIOLOGY | Age: 56
End: 2021-06-29
Payer: COMMERCIAL

## 2021-06-29 ENCOUNTER — HOSPITAL ENCOUNTER (EMERGENCY)
Age: 56
Discharge: HOME | End: 2021-06-29
Attending: EMERGENCY MEDICINE
Payer: COMMERCIAL

## 2021-06-29 VITALS
TEMPERATURE: 97.6 F | DIASTOLIC BLOOD PRESSURE: 99 MMHG | HEART RATE: 90 BPM | SYSTOLIC BLOOD PRESSURE: 182 MMHG | OXYGEN SATURATION: 99 % | RESPIRATION RATE: 15 BRPM | WEIGHT: 240 LBS | BODY MASS INDEX: 30.8 KG/M2 | HEIGHT: 74 IN

## 2021-06-29 DIAGNOSIS — M25.512 ACUTE PAIN OF LEFT SHOULDER: ICD-10-CM

## 2021-06-29 DIAGNOSIS — R55 SYNCOPE AND COLLAPSE: Primary | ICD-10-CM

## 2021-06-29 DIAGNOSIS — Z72.0 TOBACCO ABUSE: ICD-10-CM

## 2021-06-29 DIAGNOSIS — S09.90XA INJURY OF HEAD, INITIAL ENCOUNTER: ICD-10-CM

## 2021-06-29 LAB
ALBUMIN SERPL-MCNC: 4.6 GM/DL (ref 3.4–5)
ALP BLD-CCNC: 99 IU/L (ref 40–129)
ALT SERPL-CCNC: 35 U/L (ref 10–40)
ANION GAP SERPL CALCULATED.3IONS-SCNC: 11 MMOL/L (ref 4–16)
AST SERPL-CCNC: 27 IU/L (ref 15–37)
BASOPHILS ABSOLUTE: 0.1 K/CU MM
BASOPHILS RELATIVE PERCENT: 0.9 % (ref 0–1)
BILIRUB SERPL-MCNC: 0.2 MG/DL (ref 0–1)
BUN BLDV-MCNC: 23 MG/DL (ref 6–23)
CALCIUM SERPL-MCNC: 9.2 MG/DL (ref 8.3–10.6)
CHLORIDE BLD-SCNC: 107 MMOL/L (ref 99–110)
CO2: 24 MMOL/L (ref 21–32)
CREAT SERPL-MCNC: 1 MG/DL (ref 0.9–1.3)
DIFFERENTIAL TYPE: ABNORMAL
EKG ATRIAL RATE: 80 BPM
EKG DIAGNOSIS: NORMAL
EKG P AXIS: 23 DEGREES
EKG P-R INTERVAL: 136 MS
EKG Q-T INTERVAL: 390 MS
EKG QRS DURATION: 118 MS
EKG QTC CALCULATION (BAZETT): 449 MS
EKG R AXIS: -50 DEGREES
EKG T AXIS: 72 DEGREES
EKG VENTRICULAR RATE: 80 BPM
EOSINOPHILS ABSOLUTE: 0.4 K/CU MM
EOSINOPHILS RELATIVE PERCENT: 5.6 % (ref 0–3)
GFR AFRICAN AMERICAN: >60 ML/MIN/1.73M2
GFR NON-AFRICAN AMERICAN: >60 ML/MIN/1.73M2
GLUCOSE BLD-MCNC: 114 MG/DL (ref 70–99)
HCT VFR BLD CALC: 40.3 % (ref 42–52)
HEMOGLOBIN: 13.2 GM/DL (ref 13.5–18)
IMMATURE NEUTROPHIL %: 0.3 % (ref 0–0.43)
LYMPHOCYTES ABSOLUTE: 2.2 K/CU MM
LYMPHOCYTES RELATIVE PERCENT: 29.7 % (ref 24–44)
MCH RBC QN AUTO: 30.6 PG (ref 27–31)
MCHC RBC AUTO-ENTMCNC: 32.8 % (ref 32–36)
MCV RBC AUTO: 93.3 FL (ref 78–100)
MONOCYTES ABSOLUTE: 0.6 K/CU MM
MONOCYTES RELATIVE PERCENT: 8.5 % (ref 0–4)
NUCLEATED RBC %: 0 %
PDW BLD-RTO: 13.6 % (ref 11.7–14.9)
PLATELET # BLD: 218 K/CU MM (ref 140–440)
PMV BLD AUTO: 10 FL (ref 7.5–11.1)
POTASSIUM SERPL-SCNC: 4.6 MMOL/L (ref 3.5–5.1)
PRO-BNP: 88.52 PG/ML
RBC # BLD: 4.32 M/CU MM (ref 4.6–6.2)
SEGMENTED NEUTROPHILS ABSOLUTE COUNT: 4.1 K/CU MM
SEGMENTED NEUTROPHILS RELATIVE PERCENT: 55 % (ref 36–66)
SODIUM BLD-SCNC: 142 MMOL/L (ref 135–145)
TOTAL IMMATURE NEUTOROPHIL: 0.02 K/CU MM
TOTAL NUCLEATED RBC: 0 K/CU MM
TOTAL PROTEIN: 6.6 GM/DL (ref 6.4–8.2)
TROPONIN T: <0.01 NG/ML
WBC # BLD: 7.5 K/CU MM (ref 4–10.5)

## 2021-06-29 PROCEDURE — 73030 X-RAY EXAM OF SHOULDER: CPT

## 2021-06-29 PROCEDURE — 71045 X-RAY EXAM CHEST 1 VIEW: CPT

## 2021-06-29 PROCEDURE — 93010 ELECTROCARDIOGRAM REPORT: CPT | Performed by: INTERNAL MEDICINE

## 2021-06-29 PROCEDURE — 80053 COMPREHEN METABOLIC PANEL: CPT

## 2021-06-29 PROCEDURE — 85025 COMPLETE CBC W/AUTO DIFF WBC: CPT

## 2021-06-29 PROCEDURE — 93005 ELECTROCARDIOGRAM TRACING: CPT | Performed by: PHYSICIAN ASSISTANT

## 2021-06-29 PROCEDURE — 70450 CT HEAD/BRAIN W/O DYE: CPT

## 2021-06-29 PROCEDURE — 83880 ASSAY OF NATRIURETIC PEPTIDE: CPT

## 2021-06-29 PROCEDURE — 72125 CT NECK SPINE W/O DYE: CPT

## 2021-06-29 PROCEDURE — 84484 ASSAY OF TROPONIN QUANT: CPT

## 2021-06-29 PROCEDURE — 99283 EMERGENCY DEPT VISIT LOW MDM: CPT

## 2021-06-29 RX ORDER — TRAMADOL HYDROCHLORIDE 50 MG/1
50 TABLET ORAL ONCE
Status: DISCONTINUED | OUTPATIENT
Start: 2021-06-29 | End: 2021-06-29 | Stop reason: HOSPADM

## 2021-06-29 NOTE — ED PROVIDER NOTES
Patient Identification  Silvio Cunningham is a 60 y.o. male    Chief Complaint  Letter for School/Work      HPI  (History provided by patient)  This is a 60 y.o. male who was brought in by self for chief complaint of needs letter to return to work. Patient reports he was brought in yesterday after what he describes a syncopal episode at work. States has been having episodes like this for the last 5 to 6 years, usually happens after he chokes on cigarette smoke. He does not remember exactly what happened prior to syncopal episode yesterday. 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 Denies any chest pain. Came to ED and had EKG done and then left AMA. Returns today because he would like a letter to return to work. He is never seen a cardiologist and denies any history of cardiac testing in the past.  He does have a known history of hypertension and hyperlipidemia, current smoker. Dates he has been having moderate pain in his left shoulder after a syncopal episode, thinks he landed on it. Also notes he had another syncopal episode within the last few weeks and has abrasions on his forehead and nose after hitting his head. He takes 81 mg aspirin daily. REVIEW OF SYSTEMS    Constitutional:  Denies fever, chills  HENT:  Denies sore throat or ear pain   Eyes: Denies vision changes, eye pain  Cardiovascular:  Denies chest pain. + syncope  Respiratory:  Denies shortness of breath. + cough   GI:  Denies abdominal pain, nausea, vomiting  :  Denies dysuria, discharge  Musculoskeletal:  Denies back pain. + shoulder pain  Skin:  Denies rash, pruritis  Neurologic:  Denies headache, focal weakness, or sensory changes     See HPI and nursing notes for additional information     I have reviewed the following nursing documentation:  Allergies:    Allergies   Allergen Reactions    Vicodin [Hydrocodone-Acetaminophen] Rash       Past medical history:  has a past medical history of Diabetes mellitus ([address] Dyspnea on exertion (6/14/2021), Excessive daytime sleepiness (6/14/2021), Hyperlipidemia, Hypertension, and Tobacco abuse (6/14/2021). Past surgical history:  has a past surgical history that includes Knee cartilage surgery (January 2012). Home medications:   Prior to Admission medications    Medication Sig Start Date End Date Taking? Authorizing Provider   atorvastatin (LIPITOR) 20 MG tablet Take 1 tablet by mouth daily 5/14/21   Leighton Merida MD   buPROPion [facility] SR) 150 MG extended release tablet Take 1 tablet by mouth daily 5/14/21   Leighton Merida MD   citalopram (CELEXA) 20 MG tablet Take 1 tablet by mouth daily 5/14/21   Leighton Merida MD   Dulaglutide (TRULICITY) 1.5 LX/6.8AY SOPN Inject 1.5 mg into the skin once a week 5/14/21   Leighton Merida MD   fluticasone [facility]) 50 MCG/ACT nasal spray 1 spray by Each Nostril route daily 5/14/21   Leighton Merida MD   gabapentin (NEURONTIN) 300 MG capsule Take 1 capsule by mouth 3 times daily for 30 days.  5/14/21 6/13/21  Leighton Merida MD   mometasone-formoterol [facility]) 100-5 MCG/ACT inhaler Inhale 2 puffs into the lungs 2 times daily 5/14/21   Leighton Merida MD   Multiple Vitamins-Minerals (THERAPEUTIC MULTIVITAMIN-MINERALS) tablet Take 1 tablet by mouth daily 5/14/21 5/14/22  Leighton Merida MD   naproxen (NAPROSYN) 375 MG tablet Take 1 tablet by mouth 2 times daily (with meals) 5/14/21   Leighton Merida MD   omeprazole (PRILOSEC) 20 MG delayed release capsule Take 1 capsule by mouth daily 5/14/21   Leighton Merida MD   tiotropium (SPIRIVA RESPIMAT) 2.5 MCG/ACT AERS inhaler Inhale 2 puffs into the lungs daily 5/14/21   Leighton Merida MD   albuterol sulfate HFA (VENTOLIN HFA) 108 (90 Base) MCG/ACT inhaler Inhale 2 puffs into the lungs every 6 hours as needed for Wheezing 5/14/21   Leighton Merida MD   metFORMIN (GLUCOPHAGE-XR) 500 MG extended release tablet Take 1 tablet by mouth 2 times daily 5/14/21   Leighton Merida MD   losartan (COZAAR) 50 MG tablet Take 1 tablet by mouth daily 5/14/21   Davi Don MD   MUCUS RELIEF  MG extended release tablet take 1 tablet by mouth twice a day for 15 DAYS 5/3/21   Davi oDn MD   blood glucose monitor kit and supplies 1 kit by Other route 3 times daily Dispense sufficient amount (tests 3 times daily) for indicated testing frequency. Dispense all needed supplies to include: monitor, strips, lancing device, lancets, control solutions, and alcohol swabs. Dispense enough for a 30 day supply with 0 refills. 2/12/21   Davi Don MD       Social history:  reports that he has been smoking cigarettes. He started smoking about 50 years ago. He has a 45.00 pack-year smoking history. He has never used smokeless tobacco. He reports current alcohol use. He reports that he does not use drugs. Family history:    Family History   Problem Relation Age of Onset    Cancer Mother     High Blood Pressure Mother     Diabetes Mother     Diabetes Father          Exam  BP (!) 182/99   Pulse 90   Temp 97.6 °F (36.4 °C) (Oral)   Resp 15   Ht 6' 2\" (1.88 m)   Wt 240 lb (108.9 kg)   SpO2 99%   BMI 30.81 kg/m²   Nursing note and vitals reviewed. Constitutional: Well developed, well nourished. No acute distress. HENT:      Head: Normocephalic, abrasions noted over the left forehead and the nasal bridge. No tenderness palpation. No depressed skull fracture. Ears: External ears normal.      Nose: Nose normal.     Mouth: Membrane mucosa moist and pink. No posterior oropharynx erythema or tonsillar edema  Eyes: Anicteric sclera. No discharge, PERRL  Neck: Supple. Trachea midline. Cardiovascular: RRR, no murmurs, rubs, or gallops, radial pulses 2+ bilaterally. Pulmonary/Chest: Effort normal. No respiratory distress. CTAB. No stridor. No wheezes. No rales. Abdominal: Soft. Nontender to palpation. No distension. No guarding, rebound tenderness, or evidence of ascites. : No CVA tenderness. Musculoskeletal: Moves all extremities.  No gross deformity. Tender to palpation over the left shoulder, pain worsened with range of motion of shoulder but range of motion intact. No tenderness palpation of the cervical, thoracic, lumbar spine or paraspinal muscle. NEUROLOGICAL: Awake and alert. GCS 15. Cranial nerves 2-12 grossly intact. Strength 5/5 throughout. Light touch sensation intact throughout. Skin: Warm and dry. No rash. Psychiatric: Normal mood and affect. Behavior is normal.      EKG   Please see Dr. Reyes ' note for EKG read. Radiographs (if obtained):  [] The following radiograph was interpreted by myself in the absence of a radiologist:   [x] Radiologist's Report Reviewed:  CT CERVICAL SPINE WO CONTRAST   Final Result   No acute abnormality of the cervical spine. XR CHEST PORTABLE   Final Result   1. No active pulmonary disease. XR SHOULDER LEFT (MIN 2 VIEWS)   Final Result   1. No acute abnormality involving the left shoulder. CT Head WO Contrast   Final Result   No acute intracranial abnormality.                 Labs  Results for orders placed or performed during the hospital encounter of 06/29/21   CBC Auto Differential   Result Value Ref Range    WBC 7.5 4.0 - 10.5 K/CU MM    RBC 4.32 (L) 4.6 - 6.2 M/CU MM    Hemoglobin 13.2 (L) 13.5 - 18.0 GM/DL    Hematocrit 40.3 (L) 42 - 52 %    MCV 93.3 78 - 100 FL    MCH 30.6 27 - 31 PG    MCHC 32.8 32.0 - 36.0 %    RDW 13.6 11.7 - 14.9 %    Platelets 715 956 - 753 K/CU MM    MPV 10.0 7.5 - 11.1 FL    Differential Type AUTOMATED DIFFERENTIAL     Segs Relative 55.0 36 - 66 %    Lymphocytes % 29.7 24 - 44 %    Monocytes % 8.5 (H) 0 - 4 %    Eosinophils % 5.6 (H) 0 - 3 %    Basophils % 0.9 0 - 1 %    Segs Absolute 4.1 K/CU MM    Lymphocytes Absolute 2.2 K/CU MM    Monocytes Absolute 0.6 K/CU MM    Eosinophils Absolute 0.4 K/CU MM    Basophils Absolute 0.1 K/CU MM    Nucleated RBC % 0.0 %    Total Nucleated RBC 0.0 K/CU MM    Total Immature Neutrophil 0.02 K/CU MM Immature Neutrophil % 0.3 0 - 0.43 %   Comprehensive Metabolic Panel w/ Reflex to MG   Result Value Ref Range    Sodium 142 135 - 145 MMOL/L    Potassium 4.6 3.5 - 5.1 MMOL/L    Chloride 107 99 - 110 mMol/L    CO2 24 21 - 32 MMOL/L    BUN 23 6 - 23 MG/DL    CREATININE 1.0 0.9 - 1.3 MG/DL    Glucose 114 (H) 70 - 99 MG/DL    Calcium 9.2 8.3 - 10.6 MG/DL    Albumin 4.6 3.4 - 5.0 GM/DL    Total Protein 6.6 6.4 - 8.2 GM/DL    Total Bilirubin 0.2 0.0 - 1.0 MG/DL    ALT 35 10 - 40 U/L    AST 27 15 - 37 IU/L    Alkaline Phosphatase 99 40 - 129 IU/L    GFR Non-African American >60 >60 mL/min/1.73m2    GFR African American >60 >60 mL/min/1.73m2    Anion Gap 11 4 - 16   Troponin   Result Value Ref Range    Troponin T <0.010 <0.01 NG/ML   Brain Natriuretic Peptide   Result Value Ref Range    Pro-BNP 88.52 <300 PG/ML   EKG 12 Lead   Result Value Ref Range    Ventricular Rate 80 BPM    Atrial Rate 80 BPM    P-R Interval 136 ms    QRS Duration 118 ms    Q-T Interval 390 ms    QTc Calculation (Bazett) 449 ms    P Axis 23 degrees    R Axis -50 degrees    T Axis 72 degrees    Diagnosis       Sinus rhythm with marked sinus arrhythmia  Left anterior fascicular block  Left ventricular hypertrophy with QRS widening and repolarization abnormality  Cannot rule out Septal infarct , age undetermined  Abnormal ECG  When compared with ECG of 28-JUN-2021 06:28,  Minimal criteria for Septal infarct are now present           MDM  Patient presents for recurrent syncopal episodes for years, had exertion at work yesterday, came here and then refused evaluation and left. His EKG shows normal sinus rhythm, possible old infarct, similar to yesterday. His laboratory work-up reveals a mild anemia and a mild hyperglycemia. CT head and neck as well as chest x-ray left shoulder x-ray revealed no acute findings. Discussed all results with patient.   He reports his left shoulder pain is actually going on for about a month and a half and he is following up with Dr. Loreta Leventhal with orthopedics in 3 weeks. He also notes that he has been told he might of had seizures in the past, is not currently on antiepileptics, is never seen neurology or cardiology regarding these episodes. Unclear if they are truly related to choking episodes from cigarette smoke or have some other line underlying source. He appears in no distress now and has a benign work-up and is appropriate for outpatient follow-up. I estimate there is LOW risk for PE, cardiac arrhythmia, ACS/MI, CVA, intracranial hemorrhage, hydrocephalus, sepsis, status epilepticus, thus I consider the discharge disposition reasonable. Gino Caceres and I have discussed the diagnosis and risks, and we agree with discharging home to follow-up with their primary doctor. We also discussed returning to the Emergency Department immediately if new or worsening symptoms occur. We have discussed the symptoms which are most concerning (e.g., syncope, fever, CP, SOB, severe HA, new or worsening symptoms) that necessitate immediate return. This patient was directly discussed with Dr. Adrian Myles, prior to her evaluation of patient he left the department. Final Impression  1. Syncope and collapse    2. Acute pain of left shoulder    3. Injury of head, initial encounter    4. Tobacco abuse        Blood pressure (!) 182/99, pulse 90, temperature 97.6 °F (36.4 °C), temperature source Oral, resp. rate 15, height 6' 2\" (1.88 m), weight 240 lb (108.9 kg), SpO2 99 %. Disposition:  Discharge to home in stable condition. Patient was given scripts for the following medications. I counseled patient how to take these medications. Discharge Medication List as of 6/29/2021  9:55 AM          This chart was generated using the [address] [place] [address] dictation system. I created this record but it may contain dictation errors given the limitations of this technology.        Meka Naylor PA-C  06/29/21 1010

## 2021-06-29 NOTE — ED NOTES
Discharge instructions reviewed with patient, verbalized understanding and agreeable to discharge.      Jerica Whittington RN  06/29/21 1002

## 2021-06-29 NOTE — ED PROVIDER NOTES
This EKG was interpreted by me. Rate is 80, rhythm is sinus with sinus arrhythmia. HI and QT intervals are within normal limits. ST abnormality in V1, V2. These were present on previous EKG dated 6/. There is no ST segment or T wave changes. Herson Shay, DO  06/29/21 7349      I independently evaluated Christiana Rosales. In brief their history revealed syncope. States is been going on for years. States that yesterday he had this occur while at work and they told him he needed a note to return back to work. States that he passes out after he smokes a cigarette and chokes on the smoke. Their focused exam revealed awake, alert, well-hydrated, well-nourished, and in no acute distress. Breathing is unlabored. Skin is dry. The patient has normal gait, moves all extremities, and is without facial droop. ED course: 76-year-old male who presented for work note to return to work after syncopal episode yesterday. Work-up was initiated. Troponin is negative. EKG showed no evidence of malignant, sinus arrhythmia. Imaging is unremarkable. As patient had this ongoing for years we will plan for discharge home with close outpatient follow-up. All diagnostic, treatment, and disposition decisions were made by myself in conjunction with the Advanced Practice Provider. For all further details of the patient's emergency department visit, please see the Advanced Practice Provider's documentation.      Herson Shay, DO  06/29/21 1006

## 2021-06-30 ENCOUNTER — OFFICE VISIT (OUTPATIENT)
Dept: INTERNAL MEDICINE CLINIC | Age: 56
End: 2021-06-30
Payer: COMMERCIAL

## 2021-06-30 VITALS
SYSTOLIC BLOOD PRESSURE: 172 MMHG | DIASTOLIC BLOOD PRESSURE: 91 MMHG | HEIGHT: 74 IN | BODY MASS INDEX: 32.08 KG/M2 | HEART RATE: 83 BPM | WEIGHT: 250 LBS | OXYGEN SATURATION: 97 %

## 2021-06-30 DIAGNOSIS — E11.65 TYPE 2 DIABETES MELLITUS WITH HYPERGLYCEMIA, WITHOUT LONG-TERM CURRENT USE OF INSULIN (HCC): ICD-10-CM

## 2021-06-30 DIAGNOSIS — G62.9 NEUROPATHY: ICD-10-CM

## 2021-06-30 DIAGNOSIS — M25.512 PAIN AND SWELLING OF LEFT SHOULDER: ICD-10-CM

## 2021-06-30 DIAGNOSIS — R55 VASOVAGAL SYNCOPE: Primary | ICD-10-CM

## 2021-06-30 DIAGNOSIS — K21.9 GASTROESOPHAGEAL REFLUX DISEASE WITHOUT ESOPHAGITIS: ICD-10-CM

## 2021-06-30 DIAGNOSIS — E78.2 MIXED HYPERLIPIDEMIA: ICD-10-CM

## 2021-06-30 DIAGNOSIS — I10 ESSENTIAL HYPERTENSION: ICD-10-CM

## 2021-06-30 DIAGNOSIS — F32.A CHRONIC DEPRESSION: ICD-10-CM

## 2021-06-30 DIAGNOSIS — Z72.0 TOBACCO ABUSE: ICD-10-CM

## 2021-06-30 DIAGNOSIS — M25.412 PAIN AND SWELLING OF LEFT SHOULDER: ICD-10-CM

## 2021-06-30 DIAGNOSIS — J42 CHRONIC BRONCHITIS, UNSPECIFIED CHRONIC BRONCHITIS TYPE (HCC): ICD-10-CM

## 2021-06-30 LAB
CHP ED QC CHECK: NORMAL
GLUCOSE BLD-MCNC: 205 MG/DL

## 2021-06-30 PROCEDURE — G8427 DOCREV CUR MEDS BY ELIG CLIN: HCPCS | Performed by: INTERNAL MEDICINE

## 2021-06-30 PROCEDURE — 3023F SPIROM DOC REV: CPT | Performed by: INTERNAL MEDICINE

## 2021-06-30 PROCEDURE — 3017F COLORECTAL CA SCREEN DOC REV: CPT | Performed by: INTERNAL MEDICINE

## 2021-06-30 PROCEDURE — 4004F PT TOBACCO SCREEN RCVD TLK: CPT | Performed by: INTERNAL MEDICINE

## 2021-06-30 PROCEDURE — 99214 OFFICE O/P EST MOD 30 MIN: CPT | Performed by: INTERNAL MEDICINE

## 2021-06-30 PROCEDURE — 82962 GLUCOSE BLOOD TEST: CPT | Performed by: INTERNAL MEDICINE

## 2021-06-30 PROCEDURE — G8926 SPIRO NO PERF OR DOC: HCPCS | Performed by: INTERNAL MEDICINE

## 2021-06-30 PROCEDURE — 2022F DILAT RTA XM EVC RTNOPTHY: CPT | Performed by: INTERNAL MEDICINE

## 2021-06-30 PROCEDURE — 3052F HG A1C>EQUAL 8.0%<EQUAL 9.0%: CPT | Performed by: INTERNAL MEDICINE

## 2021-06-30 PROCEDURE — G8417 CALC BMI ABV UP PARAM F/U: HCPCS | Performed by: INTERNAL MEDICINE

## 2021-06-30 RX ORDER — AMLODIPINE BESYLATE 5 MG/1
5 TABLET ORAL DAILY
Qty: 30 TABLET | Refills: 3 | Status: SHIPPED | OUTPATIENT
Start: 2021-06-30

## 2021-06-30 NOTE — PROGRESS NOTES
Neuropathy    Fatigue    Dorsalgia    Chronic cough    Pain and swelling of left shoulder    Gastroesophageal reflux disease without esophagitis    Dyspnea on exertion    Tobacco abuse    Excessive daytime sleepiness    Vasovagal syncope        Past Surgical History:        Procedure Laterality Date    KNEE CARTILAGE SURGERY  January 2012       Social History:   Social History     Tobacco Use    Smoking status: Current Every Day Smoker     Packs/day: 1.00     Years: 45.00     Pack years: 45.00     Types: Cigarettes     Start date: 2/11/1971    Smokeless tobacco: Never Used   Substance Use Topics    Alcohol use: Yes     Comment: occasional       Family History:       Problem Relation Age of Onset    Cancer Mother     High Blood Pressure Mother     Diabetes Mother     Diabetes Father        Allergies:  Vicodin [hydrocodone-acetaminophen]    Current Medications :      Prior to Admission medications    Medication Sig Start Date End Date Taking?  Authorizing Provider   amLODIPine (NORVASC) 5 MG tablet Take 1 tablet by mouth daily 6/30/21  Yes Shabbir Smith MD   atorvastatin (LIPITOR) 20 MG tablet Take 1 tablet by mouth daily 5/14/21  Yes Shabbir Smith MD   buPROPion [facility] SR) 150 MG extended release tablet Take 1 tablet by mouth daily 5/14/21  Yes Shabbir Smith MD   citalopram (CELEXA) 20 MG tablet Take 1 tablet by mouth daily 5/14/21  Yes Shabbir Smith MD   Dulaglutide (TRULICITY) 1.5 MP/1.7PY SOPN Inject 1.5 mg into the skin once a week 5/14/21  Yes Shabbir Smith MD   fluticasone (FLONASE) 50 MCG/ACT nasal spray 1 spray by Each Nostril route daily 5/14/21  Yes Shabbir Smith MD   mometasone-formoterol [facility]) 100-5 MCG/ACT inhaler Inhale 2 puffs into the lungs 2 times daily 5/14/21  Yes Shabbir Smith MD   Multiple Vitamins-Minerals (THERAPEUTIC MULTIVITAMIN-MINERALS) tablet Take 1 tablet by mouth daily 5/14/21 5/14/22 Yes Shabbir Smith MD   naproxen (NAPROSYN) 375 MG tablet Take 1 tablet by mouth 2 times daily (with meals) 5/14/21  Yes Rolando Bo MD   omeprazole (PRILOSEC) 20 MG delayed release capsule Take 1 capsule by mouth daily 5/14/21  Yes Rolando Bo MD   tiotropium (SPIRIVA RESPIMAT) 2.5 MCG/ACT AERS inhaler Inhale 2 puffs into the lungs daily 5/14/21  Yes Rolando Bo MD   albuterol sulfate HFA (VENTOLIN HFA) 108 (90 Base) MCG/ACT inhaler Inhale 2 puffs into the lungs every 6 hours as needed for Wheezing 5/14/21  Yes Rolando Bo MD   metFORMIN (GLUCOPHAGE-XR) 500 MG extended release tablet Take 1 tablet by mouth 2 times daily 5/14/21  Yes Rolando Bo MD   losartan (COZAAR) 50 MG tablet Take 1 tablet by mouth daily 5/14/21  Yes Rolando Bo MD   MUCUS RELIEF  MG extended release tablet take 1 tablet by mouth twice a day for 15 DAYS 5/3/21  Yes Rolando Bo MD   gabapentin (NEURONTIN) 300 MG capsule Take 1 capsule by mouth 3 times daily for 30 days. 5/14/21 6/13/21  Rolando Bo MD       LAB DATA: Reviewed. REVIEW OF SYSTEMS:   see HPI/ Comprehensive review of systems negative except for the ones mentioned in HPI. PHYSICAL EXAMINATION:   BP (!) 172/91 (Site: Left Upper Arm, Position: Sitting, Cuff Size: Medium Adult)   Pulse 83   Ht 6' 2\" (1.88 m)   Wt 250 lb (113.4 kg)   SpO2 97%   BMI 32.10 kg/m²      GENERAL APPEARANCE:    Alert, oriented x 3, well developed, cooperative, not in any distress, appears stated age. HEAD:   Normocephalic, atraumatic   EYES:   PERRLA, EOMI, lids normal, conjuctivea clear, sclera anicteric. NECK:    Supple, symmetrical,  trachea midline, no thyromegaly, no JVD, no lymphadenopathy. LUNGS:    Clear to auscultation bilaterally, respirations unlabored, accessory muscles are not used. HEART:     Regular rate and rhythm, S1 and S2 normal, no murmur, rub or gallop. PMI in MCL. ABDOMEN:    Soft, non-tender, bowel sounds are normoactive, no masses, no hepatospleenomegaly.   EXTREMITY:   no bipedal edema, tenderness left shoulder. NEURO:  Alert, oriented to person, place and time. Grossly intact. Musculoskeletal:         No kyphosis or scoliosis, no deformity in any extremity noted, muscle strength and tone are normal.  Skin:                            Warm and dry. No rash or obvious suspicious lesions  . He has bruising on his left side of the forehead secondary to recent fall with syncope. PSYCH:  Mood euthymic, insight and judgement good. ASSESSMENT/PLAN:    1. Vasovagal syncope  Continue Celexa and refer to cardiologist and neurologist.   Yamileth Torres strongly to quit smoking completely. Advised strongly not to drive or work at this time until the work-up is complete. - Petra Salvador MD, Cardiology, [facility], Garr Libman, DO, Neurology, [place]    2. Type 2 diabetes mellitus with hyperglycemia, without long-term current use of insulin ([facility])  Advised low sugar diet and exercise. Continue Glucophage and Trulicity  - POCT Glucose    3. Essential hypertension  Blood pressure is high. Advised low salt diet and exercise and weight loss. Continue Cozaar and add Norvasc 5 mg daily. 4. Mixed hyperlipidemia  Patient is taking cholesterol medications regularly. Denies any side effects. Diet and exercise advised. Continue Lipitor    5. Chronic bronchitis, unspecified chronic bronchitis type ([address]  Continue Spiriva and Dulera. Albuterol HFA as needed. Advised extensively to quit smoking. 6. Neuropathy  Continue Neurontin    7. Chronic depression  Patient on Celexa    8. Gastroesophageal reflux disease without esophagitis  Continue Prilosec    9. Pain and swelling of left shoulder  Continue naproxen and Tylenol as needed and also Neurontin. Advised to continue to follow with Dr. Kalani Peres. 10. Tobacco abuse  Advised extensively to quit smoking. Patient does not want any medication to help quit smoking but will try on his own to quit smoking.     Advised to follow

## 2021-07-06 ENCOUNTER — TELEPHONE (OUTPATIENT)
Dept: PULMONOLOGY | Age: 56
End: 2021-07-06

## 2021-07-06 DIAGNOSIS — R06.09 DYSPNEA ON EXERTION: ICD-10-CM

## 2021-07-06 DIAGNOSIS — G47.33 OBSTRUCTIVE SLEEP APNEA: ICD-10-CM

## 2021-07-06 DIAGNOSIS — J41.0 SIMPLE CHRONIC BRONCHITIS (HCC): Primary | ICD-10-CM

## 2021-07-06 DIAGNOSIS — G47.19 EXCESSIVE DAYTIME SLEEPINESS: ICD-10-CM

## 2021-07-06 NOTE — TELEPHONE ENCOUNTER
Because Geina Sahu had an AHI of 26 on a recent apnea screening I think he should undergo a formal sleep study and will order home sleep study. He does carry a long history of snoring and excessive daytime sleepiness.

## 2021-07-06 NOTE — TELEPHONE ENCOUNTER
Pts apnea screen came back showing eval time was too short but he used it for 8 hours and 42 minutes and his AHI was 26.   Please advise

## 2021-07-16 ENCOUNTER — TELEPHONE (OUTPATIENT)
Dept: PULMONOLOGY | Age: 56
End: 2021-07-16

## 2021-08-20 ENCOUNTER — TELEPHONE (OUTPATIENT)
Dept: PULMONOLOGY | Age: 56
End: 2021-08-20